# Patient Record
Sex: FEMALE | Race: WHITE | NOT HISPANIC OR LATINO
[De-identification: names, ages, dates, MRNs, and addresses within clinical notes are randomized per-mention and may not be internally consistent; named-entity substitution may affect disease eponyms.]

---

## 2017-02-07 ENCOUNTER — APPOINTMENT (OUTPATIENT)
Dept: ELECTROPHYSIOLOGY | Facility: CLINIC | Age: 82
End: 2017-02-07

## 2017-05-09 ENCOUNTER — APPOINTMENT (OUTPATIENT)
Dept: ELECTROPHYSIOLOGY | Facility: CLINIC | Age: 82
End: 2017-05-09

## 2017-05-24 ENCOUNTER — EMERGENCY (EMERGENCY)
Facility: HOSPITAL | Age: 82
LOS: 1 days | Discharge: HOME | End: 2017-05-24
Attending: EMERGENCY MEDICINE | Admitting: EMERGENCY MEDICINE
Payer: MEDICARE

## 2017-05-24 VITALS
RESPIRATION RATE: 15 BRPM | TEMPERATURE: 98 F | HEART RATE: 60 BPM | SYSTOLIC BLOOD PRESSURE: 157 MMHG | OXYGEN SATURATION: 97 % | DIASTOLIC BLOOD PRESSURE: 74 MMHG

## 2017-05-24 VITALS
RESPIRATION RATE: 16 BRPM | SYSTOLIC BLOOD PRESSURE: 142 MMHG | DIASTOLIC BLOOD PRESSURE: 72 MMHG | TEMPERATURE: 98 F | HEART RATE: 62 BPM | OXYGEN SATURATION: 97 %

## 2017-05-24 DIAGNOSIS — Z95.0 PRESENCE OF CARDIAC PACEMAKER: Chronic | ICD-10-CM

## 2017-05-24 LAB
ALBUMIN SERPL ELPH-MCNC: 3.7 G/DL — SIGNIFICANT CHANGE UP (ref 3.3–5)
ALP SERPL-CCNC: 51 U/L — SIGNIFICANT CHANGE UP (ref 40–120)
ALT FLD-CCNC: 10 U/L — SIGNIFICANT CHANGE UP (ref 4–33)
AST SERPL-CCNC: 17 U/L — SIGNIFICANT CHANGE UP (ref 4–32)
BASOPHILS # BLD AUTO: 0.04 K/UL — SIGNIFICANT CHANGE UP (ref 0–0.2)
BASOPHILS NFR BLD AUTO: 0.3 % — SIGNIFICANT CHANGE UP (ref 0–2)
BASOPHILS NFR SPEC: 0.9 % — SIGNIFICANT CHANGE UP (ref 0–2)
BILIRUB SERPL-MCNC: 0.5 MG/DL — SIGNIFICANT CHANGE UP (ref 0.2–1.2)
BUN SERPL-MCNC: 12 MG/DL — SIGNIFICANT CHANGE UP (ref 7–23)
CALCIUM SERPL-MCNC: 9.2 MG/DL — SIGNIFICANT CHANGE UP (ref 8.4–10.5)
CHLORIDE SERPL-SCNC: 97 MMOL/L — LOW (ref 98–107)
CO2 SERPL-SCNC: 25 MMOL/L — SIGNIFICANT CHANGE UP (ref 22–31)
CREAT SERPL-MCNC: 0.78 MG/DL — SIGNIFICANT CHANGE UP (ref 0.5–1.3)
EOSINOPHIL # BLD AUTO: 0.19 K/UL — SIGNIFICANT CHANGE UP (ref 0–0.5)
EOSINOPHIL NFR BLD AUTO: 1.6 % — SIGNIFICANT CHANGE UP (ref 0–6)
EOSINOPHIL NFR FLD: 1.7 % — SIGNIFICANT CHANGE UP (ref 0–6)
ERYTHROCYTE [SEDIMENTATION RATE] IN BLOOD: 25 MM/HR — SIGNIFICANT CHANGE UP (ref 4–25)
GIANT PLATELETS BLD QL SMEAR: PRESENT — SIGNIFICANT CHANGE UP
GLUCOSE SERPL-MCNC: 111 MG/DL — HIGH (ref 70–99)
HCT VFR BLD CALC: 40.4 % — SIGNIFICANT CHANGE UP (ref 34.5–45)
HGB BLD-MCNC: 12.4 G/DL — SIGNIFICANT CHANGE UP (ref 11.5–15.5)
IMM GRANULOCYTES NFR BLD AUTO: 0.2 % — SIGNIFICANT CHANGE UP (ref 0–1.5)
LYMPHOCYTES # BLD AUTO: 18.6 % — SIGNIFICANT CHANGE UP (ref 13–44)
LYMPHOCYTES # BLD AUTO: 2.28 K/UL — SIGNIFICANT CHANGE UP (ref 1–3.3)
LYMPHOCYTES NFR SPEC AUTO: 18.3 % — SIGNIFICANT CHANGE UP (ref 13–44)
MACROCYTES BLD QL: SIGNIFICANT CHANGE UP
MCHC RBC-ENTMCNC: 27.3 PG — SIGNIFICANT CHANGE UP (ref 27–34)
MCHC RBC-ENTMCNC: 30.7 % — LOW (ref 32–36)
MCV RBC AUTO: 88.8 FL — SIGNIFICANT CHANGE UP (ref 80–100)
MICROCYTES BLD QL: SLIGHT — SIGNIFICANT CHANGE UP
MONOCYTES # BLD AUTO: 1.62 K/UL — HIGH (ref 0–0.9)
MONOCYTES NFR BLD AUTO: 13.2 % — SIGNIFICANT CHANGE UP (ref 2–14)
MONOCYTES NFR BLD: 14.8 % — HIGH (ref 2–9)
NEUTROPHIL AB SER-ACNC: 62.6 % — SIGNIFICANT CHANGE UP (ref 43–77)
NEUTROPHILS # BLD AUTO: 8.08 K/UL — HIGH (ref 1.8–7.4)
NEUTROPHILS NFR BLD AUTO: 66.1 % — SIGNIFICANT CHANGE UP (ref 43–77)
PLATELET # BLD AUTO: 262 K/UL — SIGNIFICANT CHANGE UP (ref 150–400)
PLATELET COUNT - ESTIMATE: NORMAL — SIGNIFICANT CHANGE UP
PMV BLD: 11.4 FL — SIGNIFICANT CHANGE UP (ref 7–13)
POTASSIUM SERPL-MCNC: 3.7 MMOL/L — SIGNIFICANT CHANGE UP (ref 3.5–5.3)
POTASSIUM SERPL-SCNC: 3.7 MMOL/L — SIGNIFICANT CHANGE UP (ref 3.5–5.3)
PROT SERPL-MCNC: 7 G/DL — SIGNIFICANT CHANGE UP (ref 6–8.3)
RBC # BLD: 4.55 M/UL — SIGNIFICANT CHANGE UP (ref 3.8–5.2)
RBC # FLD: 15.1 % — HIGH (ref 10.3–14.5)
SODIUM SERPL-SCNC: 138 MMOL/L — SIGNIFICANT CHANGE UP (ref 135–145)
VARIANT LYMPHS # BLD: 1.7 % — SIGNIFICANT CHANGE UP
WBC # BLD: 12.24 K/UL — HIGH (ref 3.8–10.5)
WBC # FLD AUTO: 12.24 K/UL — HIGH (ref 3.8–10.5)

## 2017-05-24 PROCEDURE — 73120 X-RAY EXAM OF HAND: CPT | Mod: 26,RT

## 2017-05-24 PROCEDURE — 99284 EMERGENCY DEPT VISIT MOD MDM: CPT | Mod: GC

## 2017-05-24 RX ORDER — IBUPROFEN 200 MG
400 TABLET ORAL ONCE
Qty: 0 | Refills: 0 | Status: COMPLETED | OUTPATIENT
Start: 2017-05-24 | End: 2017-05-24

## 2017-05-24 RX ORDER — FAMOTIDINE 10 MG/ML
20 INJECTION INTRAVENOUS DAILY
Qty: 0 | Refills: 0 | Status: DISCONTINUED | OUTPATIENT
Start: 2017-05-24 | End: 2017-05-28

## 2017-05-24 RX ADMIN — FAMOTIDINE 20 MILLIGRAM(S): 10 INJECTION INTRAVENOUS at 12:30

## 2017-05-24 RX ADMIN — Medication 400 MILLIGRAM(S): at 12:30

## 2017-05-24 NOTE — ED ADULT TRIAGE NOTE - CHIEF COMPLAINT QUOTE
Pt c/o vomiting, diarrhea and weakness and 10/10 right hand pain x 2 days. Pt denies chest pain, abdominal pain, SOB, fever, chills. Pt appears comfortable.

## 2017-05-24 NOTE — ED PROVIDER NOTE - ATTENDING CONTRIBUTION TO CARE
c/o right hand pain and swelling, tenderness noted over thmb,no fever, no trauma history given; xray negative for fracture sed rate 25, discharged home Dx arthritis, to follow up PMD

## 2017-05-24 NOTE — ED ADULT NURSE NOTE - PSH
History of Appendectomy    History of Colon Resection    Open fracture of right hip  ORIF of Rt Hip (2011)  Pacemaker    S/P LAVERNE (Total Abdominal Hysterectomy)

## 2017-05-24 NOTE — ED ADULT NURSE NOTE - PMH
Acute Coronary Syndrome    Atrial Fibrillation    Chronic Constipation    H/O gastroesophageal reflux (GERD)    History of Colon Cancer    Hypertension    Renal Calculi    S/P Cardiac Pacemaker Procedure    Tachy-Francis Syndrome

## 2017-05-24 NOTE — ED PROVIDER NOTE - MEDICAL DECISION MAKING DETAILS
pt with thenar swelling, likely inflammatory arthritis vs septic joint vs fx, will xray hand, pain control, basic labs with CRP r/o septic joint.

## 2017-05-24 NOTE — ED ADULT NURSE NOTE - OBJECTIVE STATEMENT
Patient received in room 20. Pt. is A&O x3 and ambulatory with a  walker at baseline. Pt. speaks [place]uran and requests daughter to translate. Daughter at bedside. Pt. c/o 3 episodes of vomiting, diarrhea, and right hand pain since last night and reports being unable to sleep due to the pain. As per daughter, pt. fell on her hand 1 month ago. Right hand is slightly swollen. Pt. denies fever, chills, chest pain, SOB, dizziness, weakness, changes in urinary pattern, pain on urination. Respirations are even and unlabored. Abdomen is soft, nontender. Skin is dry and intact. No apparent distress at this time. VS as noted. Labs drawn and sent. Patient received in room 20. Pt. is A&O x3 and ambulatory with a  walker at baseline. Pt. speaks [place]ese and requests daughter to translate. Daughter at bedside. Pt. c/o 3 episodes of vomiting, diarrhea, and right hand pain since last night and reports being unable to sleep due to the pain. As per daughter, pt. fell on her hand 1 month ago. Right hand is slightly swollen. Pt. denies fever, chills, chest pain, SOB, dizziness, weakness, changes in urinary pattern, pain on urination. Respirations are even and unlabored. Abdomen is soft, nontender. Skin is dry and intact. No apparent distress at this time. VS as noted. 20 gauge IV inserted in right arm by MARIELLA Corral. Labs drawn and sent.

## 2017-08-14 ENCOUNTER — APPOINTMENT (OUTPATIENT)
Dept: ELECTROPHYSIOLOGY | Facility: CLINIC | Age: 82
End: 2017-08-14
Payer: MEDICARE

## 2017-08-14 PROCEDURE — 93294 REM INTERROG EVL PM/LDLS PM: CPT

## 2017-08-14 PROCEDURE — 93296 REM INTERROG EVL PM/IDS: CPT

## 2017-11-20 ENCOUNTER — APPOINTMENT (OUTPATIENT)
Dept: ELECTROPHYSIOLOGY | Facility: CLINIC | Age: 82
End: 2017-11-20
Payer: MEDICARE

## 2017-11-20 PROCEDURE — 93294 REM INTERROG EVL PM/LDLS PM: CPT

## 2017-11-20 PROCEDURE — 93296 REM INTERROG EVL PM/IDS: CPT

## 2018-03-06 ENCOUNTER — APPOINTMENT (OUTPATIENT)
Dept: ELECTROPHYSIOLOGY | Facility: CLINIC | Age: 83
End: 2018-03-06
Payer: MEDICARE

## 2018-03-06 PROCEDURE — 93294 REM INTERROG EVL PM/LDLS PM: CPT

## 2018-03-06 PROCEDURE — 93296 REM INTERROG EVL PM/IDS: CPT

## 2018-07-09 ENCOUNTER — APPOINTMENT (OUTPATIENT)
Dept: ELECTROPHYSIOLOGY | Facility: CLINIC | Age: 83
End: 2018-07-09
Payer: MEDICARE

## 2018-07-09 PROCEDURE — 93294 REM INTERROG EVL PM/LDLS PM: CPT

## 2018-07-09 PROCEDURE — 93296 REM INTERROG EVL PM/IDS: CPT

## 2018-10-15 ENCOUNTER — APPOINTMENT (OUTPATIENT)
Dept: ELECTROPHYSIOLOGY | Facility: CLINIC | Age: 83
End: 2018-10-15
Payer: MEDICARE

## 2018-10-15 DIAGNOSIS — I49.5 SICK SINUS SYNDROME: ICD-10-CM

## 2018-10-15 PROCEDURE — 93296 REM INTERROG EVL PM/IDS: CPT

## 2018-10-15 PROCEDURE — 93294 REM INTERROG EVL PM/LDLS PM: CPT

## 2019-01-22 ENCOUNTER — APPOINTMENT (OUTPATIENT)
Dept: ELECTROPHYSIOLOGY | Facility: CLINIC | Age: 84
End: 2019-01-22

## 2019-05-22 ENCOUNTER — APPOINTMENT (OUTPATIENT)
Dept: ELECTROPHYSIOLOGY | Facility: CLINIC | Age: 84
End: 2019-05-22
Payer: MEDICARE

## 2019-05-22 PROCEDURE — 93296 REM INTERROG EVL PM/IDS: CPT

## 2019-05-22 PROCEDURE — 93294 REM INTERROG EVL PM/LDLS PM: CPT

## 2019-08-22 ENCOUNTER — APPOINTMENT (OUTPATIENT)
Dept: ELECTROPHYSIOLOGY | Facility: CLINIC | Age: 84
End: 2019-08-22
Payer: MEDICARE

## 2019-08-22 PROCEDURE — 93296 REM INTERROG EVL PM/IDS: CPT

## 2019-08-22 PROCEDURE — 93294 REM INTERROG EVL PM/LDLS PM: CPT

## 2019-08-24 ENCOUNTER — EMERGENCY (EMERGENCY)
Facility: HOSPITAL | Age: 84
LOS: 1 days | Discharge: HOME | End: 2019-08-24
Attending: EMERGENCY MEDICINE | Admitting: EMERGENCY MEDICINE
Payer: MEDICARE

## 2019-08-24 VITALS
SYSTOLIC BLOOD PRESSURE: 105 MMHG | HEART RATE: 80 BPM | OXYGEN SATURATION: 98 % | TEMPERATURE: 99 F | RESPIRATION RATE: 16 BRPM | DIASTOLIC BLOOD PRESSURE: 64 MMHG

## 2019-08-24 VITALS
OXYGEN SATURATION: 100 % | HEART RATE: 60 BPM | RESPIRATION RATE: 17 BRPM | DIASTOLIC BLOOD PRESSURE: 72 MMHG | SYSTOLIC BLOOD PRESSURE: 110 MMHG

## 2019-08-24 DIAGNOSIS — Z95.0 PRESENCE OF CARDIAC PACEMAKER: Chronic | ICD-10-CM

## 2019-08-24 LAB
ALBUMIN SERPL ELPH-MCNC: 3.4 G/DL — SIGNIFICANT CHANGE UP (ref 3.3–5)
ALP SERPL-CCNC: 48 U/L — SIGNIFICANT CHANGE UP (ref 40–120)
ALT FLD-CCNC: 6 U/L — SIGNIFICANT CHANGE UP (ref 4–33)
ANION GAP SERPL CALC-SCNC: 12 MMO/L — SIGNIFICANT CHANGE UP (ref 7–14)
APPEARANCE UR: CLEAR — SIGNIFICANT CHANGE UP
APTT BLD: 30.6 SEC — SIGNIFICANT CHANGE UP (ref 27.5–36.3)
AST SERPL-CCNC: 13 U/L — SIGNIFICANT CHANGE UP (ref 4–32)
BACTERIA # UR AUTO: NEGATIVE — SIGNIFICANT CHANGE UP
BASE EXCESS BLDV CALC-SCNC: 6.1 MMOL/L — SIGNIFICANT CHANGE UP
BASOPHILS # BLD AUTO: 0.07 K/UL — SIGNIFICANT CHANGE UP (ref 0–0.2)
BASOPHILS NFR BLD AUTO: 0.7 % — SIGNIFICANT CHANGE UP (ref 0–2)
BILIRUB SERPL-MCNC: 0.3 MG/DL — SIGNIFICANT CHANGE UP (ref 0.2–1.2)
BILIRUB UR-MCNC: NEGATIVE — SIGNIFICANT CHANGE UP
BLOOD GAS VENOUS - CREATININE: 0.78 MG/DL — SIGNIFICANT CHANGE UP (ref 0.5–1.3)
BLOOD UR QL VISUAL: NEGATIVE — SIGNIFICANT CHANGE UP
BUN SERPL-MCNC: 7 MG/DL — SIGNIFICANT CHANGE UP (ref 7–23)
CALCIUM SERPL-MCNC: 9.5 MG/DL — SIGNIFICANT CHANGE UP (ref 8.4–10.5)
CHLORIDE BLDV-SCNC: 103 MMOL/L — SIGNIFICANT CHANGE UP (ref 96–108)
CHLORIDE SERPL-SCNC: 97 MMOL/L — LOW (ref 98–107)
CO2 SERPL-SCNC: 26 MMOL/L — SIGNIFICANT CHANGE UP (ref 22–31)
COLOR SPEC: SIGNIFICANT CHANGE UP
CREAT SERPL-MCNC: 0.8 MG/DL — SIGNIFICANT CHANGE UP (ref 0.5–1.3)
EOSINOPHIL # BLD AUTO: 0.41 K/UL — SIGNIFICANT CHANGE UP (ref 0–0.5)
EOSINOPHIL NFR BLD AUTO: 4.2 % — SIGNIFICANT CHANGE UP (ref 0–6)
GAS PNL BLDV: 129 MMOL/L — LOW (ref 136–146)
GLUCOSE BLDV-MCNC: 107 MG/DL — HIGH (ref 70–99)
GLUCOSE SERPL-MCNC: 104 MG/DL — HIGH (ref 70–99)
GLUCOSE UR-MCNC: NEGATIVE — SIGNIFICANT CHANGE UP
HCO3 BLDV-SCNC: 29 MMOL/L — HIGH (ref 20–27)
HCT VFR BLD CALC: 40.3 % — SIGNIFICANT CHANGE UP (ref 34.5–45)
HCT VFR BLDV CALC: 41.2 % — SIGNIFICANT CHANGE UP (ref 34.5–45)
HGB BLD-MCNC: 12.9 G/DL — SIGNIFICANT CHANGE UP (ref 11.5–15.5)
HGB BLDV-MCNC: 13.4 G/DL — SIGNIFICANT CHANGE UP (ref 11.5–15.5)
HYALINE CASTS # UR AUTO: NEGATIVE — SIGNIFICANT CHANGE UP
IMM GRANULOCYTES NFR BLD AUTO: 0.7 % — SIGNIFICANT CHANGE UP (ref 0–1.5)
INR BLD: 1 — SIGNIFICANT CHANGE UP (ref 0.88–1.17)
KETONES UR-MCNC: NEGATIVE — SIGNIFICANT CHANGE UP
LACTATE BLDV-MCNC: 1.6 MMOL/L — SIGNIFICANT CHANGE UP (ref 0.5–2)
LEUKOCYTE ESTERASE UR-ACNC: SIGNIFICANT CHANGE UP
LYMPHOCYTES # BLD AUTO: 2.04 K/UL — SIGNIFICANT CHANGE UP (ref 1–3.3)
LYMPHOCYTES # BLD AUTO: 20.8 % — SIGNIFICANT CHANGE UP (ref 13–44)
MCHC RBC-ENTMCNC: 29.7 PG — SIGNIFICANT CHANGE UP (ref 27–34)
MCHC RBC-ENTMCNC: 32 % — SIGNIFICANT CHANGE UP (ref 32–36)
MCV RBC AUTO: 92.6 FL — SIGNIFICANT CHANGE UP (ref 80–100)
MONOCYTES # BLD AUTO: 1.04 K/UL — HIGH (ref 0–0.9)
MONOCYTES NFR BLD AUTO: 10.6 % — SIGNIFICANT CHANGE UP (ref 2–14)
NEUTROPHILS # BLD AUTO: 6.2 K/UL — SIGNIFICANT CHANGE UP (ref 1.8–7.4)
NEUTROPHILS NFR BLD AUTO: 63 % — SIGNIFICANT CHANGE UP (ref 43–77)
NITRITE UR-MCNC: NEGATIVE — SIGNIFICANT CHANGE UP
NRBC # FLD: 0 K/UL — SIGNIFICANT CHANGE UP (ref 0–0)
PCO2 BLDV: 44 MMHG — SIGNIFICANT CHANGE UP (ref 41–51)
PH BLDV: 7.45 PH — HIGH (ref 7.32–7.43)
PH UR: 7 — SIGNIFICANT CHANGE UP (ref 5–8)
PLATELET # BLD AUTO: 259 K/UL — SIGNIFICANT CHANGE UP (ref 150–400)
PMV BLD: 10.8 FL — SIGNIFICANT CHANGE UP (ref 7–13)
PO2 BLDV: 47 MMHG — HIGH (ref 35–40)
POTASSIUM BLDV-SCNC: 2.7 MMOL/L — CRITICAL LOW (ref 3.4–4.5)
POTASSIUM SERPL-MCNC: 3 MMOL/L — LOW (ref 3.5–5.3)
POTASSIUM SERPL-SCNC: 3 MMOL/L — LOW (ref 3.5–5.3)
PROT SERPL-MCNC: 6.7 G/DL — SIGNIFICANT CHANGE UP (ref 6–8.3)
PROT UR-MCNC: NEGATIVE — SIGNIFICANT CHANGE UP
PROTHROM AB SERPL-ACNC: 11.1 SEC — SIGNIFICANT CHANGE UP (ref 9.8–13.1)
RBC # BLD: 4.35 M/UL — SIGNIFICANT CHANGE UP (ref 3.8–5.2)
RBC # FLD: 14.7 % — HIGH (ref 10.3–14.5)
RBC CASTS # UR COMP ASSIST: SIGNIFICANT CHANGE UP (ref 0–?)
SAO2 % BLDV: 82 % — SIGNIFICANT CHANGE UP (ref 60–85)
SODIUM SERPL-SCNC: 135 MMOL/L — SIGNIFICANT CHANGE UP (ref 135–145)
SP GR SPEC: 1.01 — SIGNIFICANT CHANGE UP (ref 1–1.04)
SQUAMOUS # UR AUTO: SIGNIFICANT CHANGE UP
UROBILINOGEN FLD QL: NORMAL — SIGNIFICANT CHANGE UP
WBC # BLD: 9.83 K/UL — SIGNIFICANT CHANGE UP (ref 3.8–10.5)
WBC # FLD AUTO: 9.83 K/UL — SIGNIFICANT CHANGE UP (ref 3.8–10.5)
WBC UR QL: HIGH (ref 0–?)

## 2019-08-24 PROCEDURE — 99284 EMERGENCY DEPT VISIT MOD MDM: CPT

## 2019-08-24 PROCEDURE — 74177 CT ABD & PELVIS W/CONTRAST: CPT | Mod: 26

## 2019-08-24 RX ORDER — ACETAMINOPHEN 500 MG
650 TABLET ORAL ONCE
Refills: 0 | Status: COMPLETED | OUTPATIENT
Start: 2019-08-24 | End: 2019-08-24

## 2019-08-24 RX ORDER — CEFTRIAXONE 500 MG/1
1000 INJECTION, POWDER, FOR SOLUTION INTRAMUSCULAR; INTRAVENOUS ONCE
Refills: 0 | Status: COMPLETED | OUTPATIENT
Start: 2019-08-24 | End: 2019-08-24

## 2019-08-24 RX ORDER — CEPHALEXIN 500 MG
1 CAPSULE ORAL
Qty: 28 | Refills: 0
Start: 2019-08-24 | End: 2019-08-30

## 2019-08-24 RX ORDER — SODIUM CHLORIDE 9 MG/ML
500 INJECTION INTRAMUSCULAR; INTRAVENOUS; SUBCUTANEOUS ONCE
Refills: 0 | Status: COMPLETED | OUTPATIENT
Start: 2019-08-24 | End: 2019-08-24

## 2019-08-24 RX ORDER — LIDOCAINE 4 G/100G
1 CREAM TOPICAL ONCE
Refills: 0 | Status: COMPLETED | OUTPATIENT
Start: 2019-08-24 | End: 2019-08-24

## 2019-08-24 RX ORDER — POTASSIUM CHLORIDE 20 MEQ
40 PACKET (EA) ORAL ONCE
Refills: 0 | Status: COMPLETED | OUTPATIENT
Start: 2019-08-24 | End: 2019-08-24

## 2019-08-24 RX ADMIN — Medication 650 MILLIGRAM(S): at 14:51

## 2019-08-24 RX ADMIN — SODIUM CHLORIDE 500 MILLILITER(S): 9 INJECTION INTRAMUSCULAR; INTRAVENOUS; SUBCUTANEOUS at 14:26

## 2019-08-24 RX ADMIN — Medication 40 MILLIEQUIVALENT(S): at 17:36

## 2019-08-24 RX ADMIN — CEFTRIAXONE 100 MILLIGRAM(S): 500 INJECTION, POWDER, FOR SOLUTION INTRAMUSCULAR; INTRAVENOUS at 17:36

## 2019-08-24 RX ADMIN — LIDOCAINE 1 PATCH: 4 CREAM TOPICAL at 14:51

## 2019-08-24 NOTE — ED ADULT NURSE NOTE - CHIEF COMPLAINT QUOTE
Pt Sami speaking, with daughter at bedside to translate. Pt brought in by EMS from home complaining of lower back pain. Pt recently had an X-ray. Pt denies recent fall or injury.

## 2019-08-24 NOTE — ED ADULT NURSE NOTE - OBJECTIVE STATEMENT
Pt arrived to room 13, a&ox4, ambulatory with walker at baseline, calm and cooperative. C/o of lower back pain and abdominal pain. Pt states to feel nauseous and constipated, last BM about 3 days ago. Pt denies fall or any injury, chest pain, sob, vomiting. Respirations even and unlabored, abdomen distended, tender to right quadrant, no lower extremity edema noted. provider at bedside to further eval Pt arrived to room 13, a&ox4, ambulatory with walker at baseline, calm and cooperative. C/o of lower back pain and abdominal pain. Pt states to feel nauseous and constipated, last BM about 3 days ago. Pt denies fall or any injury, chest pain, sob, vomiting. Respirations even and unlabored, abdomen distended, tender to right quadrant, no lower extremity edema noted. 20 G IV to right ac, labs drawn and sent. provider at bedside to further eval

## 2019-08-24 NOTE — ED ADULT NURSE NOTE - NSIMPLEMENTINTERV_GEN_ALL_ED
Implemented All Universal Safety Interventions:  [place] to call system. Call bell, personal items and telephone within reach. Instruct patient to call for assistance. Room bathroom lighting operational. Non-slip footwear when patient is off stretcher. Physically safe environment: no spills, clutter or unnecessary equipment. Stretcher in lowest position, wheels locked, appropriate side rails in place.

## 2019-08-24 NOTE — ED PROVIDER NOTE - CARE PLAN
Principal Discharge DX:	UTI (urinary tract infection)  Secondary Diagnosis:	Ventral hernia  Secondary Diagnosis:	Back pain

## 2019-08-24 NOTE — ED PROVIDER NOTE - OBJECTIVE STATEMENT
89 year old female with a PMHx of HTN, bradycardia s/p pacemaker, colon ca pw lower back pain for 1 week. Pain is worse when moving. Pts daughter is at the bedside and states that she is requiring more assistance due to her back discomfort. Ambulates with a walker at her baseline. Has been taking Tylenol without relief. Endorses that in the past she has had cortisone injections for the pain but her new PCP will not. She had an xray of the back which was WNL. Daughter states that she noticed her urine having a foul odor and she has had decreased PO intake and constipation. Denies n/v/f/c, CP, SOB, abdominal pain, dysuria, hematuria, melena, hematochezia, diarrhea, saddle anesthesia, urinary retention, bowel/bladder incontinence, numbness/weakness/tingling in the extremities.

## 2019-08-24 NOTE — ED ADULT TRIAGE NOTE - CHIEF COMPLAINT QUOTE
Pt Swedish speaking, with daughter at bedside to translate. Pt brought in by EMS from home complaining of lower back pain. Pt recently had an X-ray. Pt denies recent fall or injury.

## 2019-08-24 NOTE — ED PROVIDER NOTE - NSFOLLOWUPINSTRUCTIONS_ED_ALL_ED_FT
Drink plenty of fluids - stay hydrated. Take Keflex 500mg four times a day for 7 days. Please continue all home medications as directed. See your regular doctor within 72 hours for follow-up care. Return to ER for new or worsening symptoms.

## 2019-08-24 NOTE — ED PROVIDER NOTE - PROGRESS NOTE DETAILS
JAMES Wells: pt NAD, VSS, no acute complaints. Pt tolerating PO. Discussed results of labs and imaging with the patient. Pt ambulatory with walker as per daughter - does not have walker here. Pts daughter live with the patient at home - she does not have fall concerns if patient has walker with her - daughter is able to supervise the patient at home. Pt ok for dc.

## 2019-08-24 NOTE — ED PROVIDER NOTE - RELIEVING FACTORS
[Dear  ___] : Dear  [unfilled], [Courtesy Letter:] : I had the pleasure of seeing your patient, [unfilled], in my office today. [Please see my note below.] : Please see my note below. lying down

## 2019-08-24 NOTE — ED PROVIDER NOTE - ABDOMINAL TENDER
umbilical/epigastric/right upper quadrant/left lower quadrant/periumbilical/suprapubic/left upper quadrant/right lower quadrant

## 2019-08-24 NOTE — ED PROVIDER NOTE - ATTENDING CONTRIBUTION TO CARE
Physician at bedside.     Pt was seen and evaluated by me. Pt is a 89 y/o female with a PMHx of HTN, bradycardia s/p pacemaker, and Colon Ca who presented to the ED for low back pain X 1 wk. Pt states over the past week having low back pain. Pt had an Xray that showed possible compression fx. Pt denies any falls or trauma. Daughter states pt has been taking Tylenol with some relief. Pt admits to nausea, denies any vomiting. Denies any SOB, chest pain, or dysuria. Lungs CTA b/l. RRR. Abd soft with low abd tenderness. No midline tenderness. No CVAT. 5/5 b/l LE. No calf tenderness or swelling.

## 2019-08-24 NOTE — ED PROVIDER NOTE - CLINICAL SUMMARY MEDICAL DECISION MAKING FREE TEXT BOX
89 year old female with a PMHx of HTN, bradycardia s/p pacemaker, colon ca pw lower back pain for 1 week.   Plan: pain management, CT abd/pelvis r/o obstruction, UA r/o UTI, labs 89 year old female with a PMHx of HTN, bradycardia s/p pacemaker, colon ca pw lower back pain for 1 week with abd tenderness  Concern for Pyelo/UTI, Obstruction.   Plan: pain management, CT abd/pelvis r/o obstruction, UA r/o UTI, labs

## 2019-08-26 LAB
BACTERIA UR CULT: SIGNIFICANT CHANGE UP
SPECIMEN SOURCE: SIGNIFICANT CHANGE UP

## 2019-11-22 ENCOUNTER — APPOINTMENT (OUTPATIENT)
Dept: ELECTROPHYSIOLOGY | Facility: CLINIC | Age: 84
End: 2019-11-22
Payer: MEDICARE

## 2019-11-22 PROCEDURE — 93294 REM INTERROG EVL PM/LDLS PM: CPT

## 2019-11-22 PROCEDURE — 93296 REM INTERROG EVL PM/IDS: CPT

## 2020-02-08 ENCOUNTER — EMERGENCY (EMERGENCY)
Facility: HOSPITAL | Age: 85
LOS: 1 days | Discharge: HOME | End: 2020-02-08
Attending: EMERGENCY MEDICINE | Admitting: EMERGENCY MEDICINE
Payer: MEDICARE

## 2020-02-08 VITALS
HEART RATE: 70 BPM | RESPIRATION RATE: 16 BRPM | OXYGEN SATURATION: 100 % | SYSTOLIC BLOOD PRESSURE: 133 MMHG | DIASTOLIC BLOOD PRESSURE: 67 MMHG | TEMPERATURE: 98 F

## 2020-02-08 VITALS
DIASTOLIC BLOOD PRESSURE: 61 MMHG | RESPIRATION RATE: 18 BRPM | SYSTOLIC BLOOD PRESSURE: 128 MMHG | HEART RATE: 68 BPM | OXYGEN SATURATION: 100 % | TEMPERATURE: 98 F

## 2020-02-08 DIAGNOSIS — Z95.0 PRESENCE OF CARDIAC PACEMAKER: Chronic | ICD-10-CM

## 2020-02-08 LAB
ALBUMIN SERPL ELPH-MCNC: 3.6 G/DL — SIGNIFICANT CHANGE UP (ref 3.3–5)
ALP SERPL-CCNC: 51 U/L — SIGNIFICANT CHANGE UP (ref 40–120)
ALT FLD-CCNC: 10 U/L — SIGNIFICANT CHANGE UP (ref 4–33)
ANION GAP SERPL CALC-SCNC: 14 MMO/L — SIGNIFICANT CHANGE UP (ref 7–14)
AST SERPL-CCNC: 18 U/L — SIGNIFICANT CHANGE UP (ref 4–32)
BILIRUB SERPL-MCNC: 0.5 MG/DL — SIGNIFICANT CHANGE UP (ref 0.2–1.2)
BUN SERPL-MCNC: 9 MG/DL — SIGNIFICANT CHANGE UP (ref 7–23)
CALCIUM SERPL-MCNC: 10.2 MG/DL — SIGNIFICANT CHANGE UP (ref 8.4–10.5)
CHLORIDE SERPL-SCNC: 93 MMOL/L — LOW (ref 98–107)
CO2 SERPL-SCNC: 28 MMOL/L — SIGNIFICANT CHANGE UP (ref 22–31)
CREAT SERPL-MCNC: 0.88 MG/DL — SIGNIFICANT CHANGE UP (ref 0.5–1.3)
GLUCOSE SERPL-MCNC: 110 MG/DL — HIGH (ref 70–99)
HCT VFR BLD CALC: 45.9 % — HIGH (ref 34.5–45)
HGB BLD-MCNC: 14.4 G/DL — SIGNIFICANT CHANGE UP (ref 11.5–15.5)
LIDOCAIN IGE QN: 11.9 U/L — SIGNIFICANT CHANGE UP (ref 7–60)
MCHC RBC-ENTMCNC: 31.2 PG — SIGNIFICANT CHANGE UP (ref 27–34)
MCHC RBC-ENTMCNC: 31.4 % — LOW (ref 32–36)
MCV RBC AUTO: 99.4 FL — SIGNIFICANT CHANGE UP (ref 80–100)
NRBC # FLD: 0 K/UL — SIGNIFICANT CHANGE UP (ref 0–0)
PLATELET # BLD AUTO: 260 K/UL — SIGNIFICANT CHANGE UP (ref 150–400)
PMV BLD: 11.2 FL — SIGNIFICANT CHANGE UP (ref 7–13)
POTASSIUM SERPL-MCNC: 3.3 MMOL/L — LOW (ref 3.5–5.3)
POTASSIUM SERPL-SCNC: 3.3 MMOL/L — LOW (ref 3.5–5.3)
PROT SERPL-MCNC: 7.4 G/DL — SIGNIFICANT CHANGE UP (ref 6–8.3)
RBC # BLD: 4.62 M/UL — SIGNIFICANT CHANGE UP (ref 3.8–5.2)
RBC # FLD: 13.3 % — SIGNIFICANT CHANGE UP (ref 10.3–14.5)
SODIUM SERPL-SCNC: 135 MMOL/L — SIGNIFICANT CHANGE UP (ref 135–145)
WBC # BLD: 13.96 K/UL — HIGH (ref 3.8–10.5)
WBC # FLD AUTO: 13.96 K/UL — HIGH (ref 3.8–10.5)

## 2020-02-08 PROCEDURE — 74177 CT ABD & PELVIS W/CONTRAST: CPT | Mod: 26

## 2020-02-08 PROCEDURE — 99284 EMERGENCY DEPT VISIT MOD MDM: CPT | Mod: GC

## 2020-02-08 RX ORDER — AER TRAVELER 0.5 G/1
1 SOLUTION RECTAL; TOPICAL ONCE
Refills: 0 | Status: COMPLETED | OUTPATIENT
Start: 2020-02-08 | End: 2020-02-08

## 2020-02-08 RX ORDER — ACETAMINOPHEN 500 MG
650 TABLET ORAL ONCE
Refills: 0 | Status: COMPLETED | OUTPATIENT
Start: 2020-02-08 | End: 2020-02-08

## 2020-02-08 RX ORDER — SODIUM CHLORIDE 9 MG/ML
500 INJECTION INTRAMUSCULAR; INTRAVENOUS; SUBCUTANEOUS ONCE
Refills: 0 | Status: COMPLETED | OUTPATIENT
Start: 2020-02-08 | End: 2020-02-08

## 2020-02-08 RX ORDER — AER TRAVELER 0.5 G/1
1 SOLUTION RECTAL; TOPICAL ONCE
Refills: 0 | Status: DISCONTINUED | OUTPATIENT
Start: 2020-02-08 | End: 2020-02-08

## 2020-02-08 RX ORDER — ONDANSETRON 8 MG/1
4 TABLET, FILM COATED ORAL ONCE
Refills: 0 | Status: COMPLETED | OUTPATIENT
Start: 2020-02-08 | End: 2020-02-08

## 2020-02-08 RX ADMIN — Medication 650 MILLIGRAM(S): at 16:46

## 2020-02-08 RX ADMIN — SODIUM CHLORIDE 500 MILLILITER(S): 9 INJECTION INTRAMUSCULAR; INTRAVENOUS; SUBCUTANEOUS at 15:59

## 2020-02-08 RX ADMIN — Medication 650 MILLIGRAM(S): at 16:00

## 2020-02-08 RX ADMIN — ONDANSETRON 4 MILLIGRAM(S): 8 TABLET, FILM COATED ORAL at 16:00

## 2020-02-08 NOTE — ED PROVIDER NOTE - NSFOLLOWUPINSTRUCTIONS_ED_ALL_ED_FT
You were seen in the Emergency Department for abdominal pain and disimpacted due to concerns about constipation.  Recommend taking Docusate and Senna (over-the-counter) as per package directions to assist with constipation.  Can also use Sitz baths and Preoperation H for pain associated with hemorrhoids.    Recommend follow-up with surgery if persistent pain; referral sheet provided.    Follow-up with your Primary Care Physician within 24-48 hours.  Please return to the Emergency Department immediately for any new, worsening or concerning symptoms.    Copy of your lab work, imaging and/or other testing performed in the ER is attached along with your discharge paperwork.  Please take this to your Primary Care Physician for further discussion, evaluation and comparison with your prior blood work results.

## 2020-02-08 NOTE — ED PROVIDER NOTE - CLINICAL SUMMARY MEDICAL DECISION MAKING FREE TEXT BOX
89 yo HTN p/w constipation and fecal impaction w/ mild abdominal discomfort. No temperature, hemodynamically stable. Disimpacted, will give tylenol, fluids, zofran, miralax, f/u for BM.

## 2020-02-08 NOTE — ED PROVIDER NOTE - PHYSICAL EXAMINATION
Constitutional: NAD, awake and alert  EYES: EOMI  ENT:  Normal Hearing, no tonsillar exudates   Neck: Soft and supple , no thyromegaly   Respiratory: Breath sounds are clear bilaterally, No wheezing, rales or rhonchi  Cardiovascular: S1 and S2, regular rate and rhythm, no Murmurs, gallops or rubs, no JVD,    Gastrointestinal: Bowel Sounds present, soft, nontender, distention, no guarding, no rebound  Extremities: No cyanosis or clubbing; warm to touch  Vascular: 2+ peripheral pulses lower ex  Neurological: No focal deficits, CN II-XII intact bilaterally, sensation to light touch intact in all extremities, gait intact. Pupils are equally reactive to light and symmetrical in size.   Musculoskeletal: 5/5 strength b/l upper and lower extremities; no joint swelling.  Skin: No rashes  Psych: no depression or anhedonia, AAOx3  HEME: no bruises, no nose bleeds Constitutional: NAD, awake and alert  EYES: EOMI  ENT:  Normal Hearing, no tonsillar exudates   Neck: Soft and supple , no thyromegaly   Respiratory: Breath sounds are clear bilaterally, No wheezing, rales or rhonchi  Cardiovascular: SNo Murmurs, gallops or rubs, no JVD,    Gastrointestinal: Bowel Sounds present, soft, nontender, distention, no guarding, no rebound  Extremities: No cyanosis or clubbing; warm to touch  Vascular: 2+ peripheral pulses lower ex  Neurological: No focal deficits, CN II-XII intact bilaterally, sensation to light touch intact in all extremities, gait intact. Pupils are equally reactive to light and symmetrical in size.   Musculoskeletal: 5/5 strength b/l upper and lower extremities; no joint swelling.  Skin: No rashes  Psych: no depression or anhedonia, AAOx3  HEME: no bruises, no nose bleeds

## 2020-02-08 NOTE — ED PROVIDER NOTE - PATIENT PORTAL LINK FT
You can access the FollowMyHealth Patient Portal offered by [facility] by registering at the following website: [email or web address]. By joining [facility]’s FollowMyHealth portal, you will also be able to view your health information using other applications (apps) compatible with our system.

## 2020-02-08 NOTE — ED ADULT NURSE NOTE - OBJECTIVE STATEMENT
Pt a&ox3 brought in for constipation for the past week, abdominal pain, nausea and pain to rectum, h/o hemorrhoids, denies blood in stool, breathing even and unlabored, denies cp/discomfort, no headache/dizziness, skin is cool dry and intact, ivl labs, will continue to monitor.

## 2020-02-08 NOTE — ED PROVIDER NOTE - NS ED ROS FT
ROS:    Constitutional: [ ] fevers [ ] chills [ ] weight loss [ ] weight gain  HEENT: [ ] dry eyes [ ] eye irritation [ ] postnasal drip [ ] nasal congestion  CV: [ ] chest pain [ ] orthopnea [ ] palpitations [ ] murmur  Resp: [ ] cough [ ] shortness of breath [ ] dyspnea [ ] wheezing [ ] sputum [ x] hemoptysis  GI: [ ] nausea [ ] vomiting [ ] diarrhea [ x] constipation [ ] abd pain [ ] dysphagia   : [ ] dysuria [ ] nocturia [ ] hematuria [ ] increased urinary frequency  Musculoskeletal: [ ] back pain [ ] myalgias [ ] arthralgias [ ] fracture  Skin: [ ] rash [ ] itch  Neurological: [ ] headache [ ] dizziness [ ] syncope [ ] weakness [ ] numbness  Psychiatric: [ ] anxiety [ ] depression  Endocrine: [ ] diabetes [ ] thyroid problem  Hematologic/Lymphatic: [ ] anemia [ ] bleeding problem  Allergic/Immunologic: [ ] itchy eyes [ ] nasal discharge [ ] hives [ ] angioedema  [ ] All other systems negative  [ ] Unable to assess ROS because ________

## 2020-02-08 NOTE — ED PROVIDER NOTE - OBJECTIVE STATEMENT
89 yo woman PMH of HTN here for constipation. Pt has not had regular bowel movement for 3-4 days per family, reported having some liquid stool, no BRBPR or melena. Daughter reports being told patient needs a disimpaction by PCP, daughter reports giving miralax, suppository, enema at home. Denying fevers, chills, SOB, CP, N/V. Pt w/ abdominal discomfort and mild distention but no focal pain. Pt has history of constipation, never required disimpaction.

## 2020-02-08 NOTE — ED PROVIDER NOTE - PROGRESS NOTE DETAILS
Dr. Vera: Pt was disimpacted with a large amount of stool. Pt states feeling better but continues to have lower abd discomfort, awaiting CT. Mattie ACKERMAN: Patient reports feeling better; discussed CT scan results and possible surgical evaluation for hernia.  Family declines; risk, benefits and alternatives discussed.  Surgical outpatient follow-up provided.

## 2020-02-08 NOTE — ED PROVIDER NOTE - ATTENDING CONTRIBUTION TO CARE
Pt was seen and evaluated by me. Pt is a 89 y/o female with PMHx of HTN, CAD, A-fib, and Chronic constipation who presented to the ED for constipation and lower abd pain X 3-4 days. Daughter notes pt has not had a BM in 3-4 days. Pt was given Miralax, Suppository, and Enema at home with no relief. Daughter contacted PMD who advised daughter to disimpact pt which daughter attempted. Pt admits to some abd discomfort. Denies any fever, chills, nausea, vomiting, SOB, or chest pain. Lungs CTA b/l. RRR. Abd soft with ventral hernia and lower abd tenderness. Moderate stool in rectal vault.  Concern for impaction/obstruction/incarcerated hernia.  Labs, IVF, Disimpaction, CT.

## 2020-02-21 ENCOUNTER — APPOINTMENT (OUTPATIENT)
Dept: ELECTROPHYSIOLOGY | Facility: CLINIC | Age: 85
End: 2020-02-21
Payer: MEDICARE

## 2020-02-21 PROCEDURE — 93294 REM INTERROG EVL PM/LDLS PM: CPT

## 2020-02-21 PROCEDURE — 93296 REM INTERROG EVL PM/IDS: CPT

## 2020-05-22 ENCOUNTER — APPOINTMENT (OUTPATIENT)
Dept: ELECTROPHYSIOLOGY | Facility: CLINIC | Age: 85
End: 2020-05-22
Payer: MEDICARE

## 2020-05-22 PROCEDURE — 93294 REM INTERROG EVL PM/LDLS PM: CPT

## 2020-05-22 PROCEDURE — 93296 REM INTERROG EVL PM/IDS: CPT

## 2020-08-21 ENCOUNTER — APPOINTMENT (OUTPATIENT)
Dept: ELECTROPHYSIOLOGY | Facility: CLINIC | Age: 85
End: 2020-08-21
Payer: MEDICARE

## 2020-08-21 PROCEDURE — 93294 REM INTERROG EVL PM/LDLS PM: CPT

## 2020-08-21 PROCEDURE — 93296 REM INTERROG EVL PM/IDS: CPT

## 2020-11-20 ENCOUNTER — APPOINTMENT (OUTPATIENT)
Dept: ELECTROPHYSIOLOGY | Facility: CLINIC | Age: 85
End: 2020-11-20
Payer: MEDICARE

## 2020-11-20 PROCEDURE — 93294 REM INTERROG EVL PM/LDLS PM: CPT

## 2020-11-20 PROCEDURE — 93296 REM INTERROG EVL PM/IDS: CPT

## 2020-11-23 ENCOUNTER — FORM ENCOUNTER (OUTPATIENT)
Age: 85
End: 2020-11-23

## 2021-01-01 ENCOUNTER — INPATIENT (INPATIENT)
Facility: HOSPITAL | Age: 86
LOS: 5 days | Discharge: HOME | DRG: 56 | End: 2022-01-06
Attending: INTERNAL MEDICINE | Admitting: INTERNAL MEDICINE
Payer: MEDICARE

## 2021-01-01 ENCOUNTER — APPOINTMENT (OUTPATIENT)
Dept: ELECTROPHYSIOLOGY | Facility: CLINIC | Age: 86
End: 2021-01-01
Payer: MEDICARE

## 2021-01-01 ENCOUNTER — INPATIENT (INPATIENT)
Facility: HOSPITAL | Age: 86
LOS: 2 days | Discharge: HOME HEALTH | DRG: 690 | End: 2021-11-14
Attending: INTERNAL MEDICINE | Admitting: INTERNAL MEDICINE
Payer: MEDICARE

## 2021-01-01 ENCOUNTER — TRANSCRIPTION ENCOUNTER (OUTPATIENT)
Age: 86
End: 2021-01-01

## 2021-01-01 ENCOUNTER — NON-APPOINTMENT (OUTPATIENT)
Age: 86
End: 2021-01-01

## 2021-01-01 ENCOUNTER — FORM ENCOUNTER (OUTPATIENT)
Age: 86
End: 2021-01-01

## 2021-01-01 VITALS
SYSTOLIC BLOOD PRESSURE: 117 MMHG | DIASTOLIC BLOOD PRESSURE: 81 MMHG | HEART RATE: 97 BPM | OXYGEN SATURATION: 99 % | RESPIRATION RATE: 18 BRPM | TEMPERATURE: 98 F | HEIGHT: 64 IN

## 2021-01-01 VITALS
TEMPERATURE: 98 F | HEART RATE: 75 BPM | SYSTOLIC BLOOD PRESSURE: 146 MMHG | RESPIRATION RATE: 18 BRPM | OXYGEN SATURATION: 96 % | DIASTOLIC BLOOD PRESSURE: 68 MMHG

## 2021-01-01 VITALS
TEMPERATURE: 98 F | DIASTOLIC BLOOD PRESSURE: 85 MMHG | HEIGHT: 64 IN | WEIGHT: 151.9 LBS | RESPIRATION RATE: 18 BRPM | SYSTOLIC BLOOD PRESSURE: 145 MMHG | HEART RATE: 89 BPM

## 2021-01-01 DIAGNOSIS — Z95.0 PRESENCE OF CARDIAC PACEMAKER: Chronic | ICD-10-CM

## 2021-01-01 DIAGNOSIS — N17.9 ACUTE KIDNEY FAILURE, UNSPECIFIED: ICD-10-CM

## 2021-01-01 DIAGNOSIS — R62.7 ADULT FAILURE TO THRIVE: ICD-10-CM

## 2021-01-01 LAB
ALBUMIN SERPL ELPH-MCNC: 2.4 G/DL — LOW (ref 3.3–5)
ALBUMIN SERPL ELPH-MCNC: 3.4 G/DL — SIGNIFICANT CHANGE UP (ref 3.3–5)
ALP SERPL-CCNC: 61 U/L — SIGNIFICANT CHANGE UP (ref 40–120)
ALP SERPL-CCNC: 65 U/L — SIGNIFICANT CHANGE UP (ref 40–120)
ALT FLD-CCNC: 14 U/L — SIGNIFICANT CHANGE UP (ref 10–45)
ALT FLD-CCNC: 9 U/L — LOW (ref 10–45)
ANION GAP SERPL CALC-SCNC: 12 MMOL/L — SIGNIFICANT CHANGE UP (ref 5–17)
ANION GAP SERPL CALC-SCNC: 12 MMOL/L — SIGNIFICANT CHANGE UP (ref 5–17)
ANION GAP SERPL CALC-SCNC: 13 MMOL/L — SIGNIFICANT CHANGE UP (ref 5–17)
ANION GAP SERPL CALC-SCNC: 14 MMOL/L — SIGNIFICANT CHANGE UP (ref 5–17)
ANION GAP SERPL CALC-SCNC: 15 MMOL/L — SIGNIFICANT CHANGE UP (ref 5–17)
ANION GAP SERPL CALC-SCNC: 16 MMOL/L — SIGNIFICANT CHANGE UP (ref 5–17)
ANION GAP SERPL CALC-SCNC: 30 MMOL/L — HIGH (ref 5–17)
APPEARANCE UR: ABNORMAL
APPEARANCE UR: ABNORMAL
AST SERPL-CCNC: 25 U/L — SIGNIFICANT CHANGE UP (ref 10–40)
AST SERPL-CCNC: 46 U/L — HIGH (ref 10–40)
BACTERIA # UR AUTO: ABNORMAL
BACTERIA # UR AUTO: NEGATIVE — SIGNIFICANT CHANGE UP
BASE EXCESS BLDV CALC-SCNC: 4.5 MMOL/L — HIGH (ref -2–2)
BASOPHILS # BLD AUTO: 0 K/UL — SIGNIFICANT CHANGE UP (ref 0–0.2)
BASOPHILS # BLD AUTO: 0.09 K/UL — SIGNIFICANT CHANGE UP (ref 0–0.2)
BASOPHILS NFR BLD AUTO: 0 % — SIGNIFICANT CHANGE UP (ref 0–2)
BASOPHILS NFR BLD AUTO: 0.8 % — SIGNIFICANT CHANGE UP (ref 0–2)
BILIRUB SERPL-MCNC: 0.3 MG/DL — SIGNIFICANT CHANGE UP (ref 0.2–1.2)
BILIRUB SERPL-MCNC: 0.9 MG/DL — SIGNIFICANT CHANGE UP (ref 0.2–1.2)
BILIRUB UR-MCNC: NEGATIVE — SIGNIFICANT CHANGE UP
BILIRUB UR-MCNC: NEGATIVE — SIGNIFICANT CHANGE UP
BUN SERPL-MCNC: 14 MG/DL — SIGNIFICANT CHANGE UP (ref 7–23)
BUN SERPL-MCNC: 15 MG/DL — SIGNIFICANT CHANGE UP (ref 7–23)
BUN SERPL-MCNC: 17 MG/DL — SIGNIFICANT CHANGE UP (ref 7–23)
BUN SERPL-MCNC: 18 MG/DL — SIGNIFICANT CHANGE UP (ref 7–23)
BUN SERPL-MCNC: 18 MG/DL — SIGNIFICANT CHANGE UP (ref 7–23)
BUN SERPL-MCNC: 19 MG/DL — SIGNIFICANT CHANGE UP (ref 7–23)
BUN SERPL-MCNC: 23 MG/DL — SIGNIFICANT CHANGE UP (ref 7–23)
CA-I SERPL-SCNC: 1.12 MMOL/L — LOW (ref 1.15–1.33)
CALCIUM SERPL-MCNC: 6.8 MG/DL — LOW (ref 8.4–10.5)
CALCIUM SERPL-MCNC: 8.6 MG/DL — SIGNIFICANT CHANGE UP (ref 8.4–10.5)
CALCIUM SERPL-MCNC: 9 MG/DL — SIGNIFICANT CHANGE UP (ref 8.4–10.5)
CALCIUM SERPL-MCNC: 9.1 MG/DL — SIGNIFICANT CHANGE UP (ref 8.4–10.5)
CALCIUM SERPL-MCNC: 9.5 MG/DL — SIGNIFICANT CHANGE UP (ref 8.4–10.5)
CALCIUM SERPL-MCNC: 9.5 MG/DL — SIGNIFICANT CHANGE UP (ref 8.4–10.5)
CALCIUM SERPL-MCNC: 9.8 MG/DL — SIGNIFICANT CHANGE UP (ref 8.4–10.5)
CHLORIDE BLDV-SCNC: 91 MMOL/L — LOW (ref 96–108)
CHLORIDE SERPL-SCNC: 100 MMOL/L — SIGNIFICANT CHANGE UP (ref 96–108)
CHLORIDE SERPL-SCNC: 74 MMOL/L — LOW (ref 96–108)
CHLORIDE SERPL-SCNC: 91 MMOL/L — LOW (ref 96–108)
CHLORIDE SERPL-SCNC: 96 MMOL/L — SIGNIFICANT CHANGE UP (ref 96–108)
CHLORIDE SERPL-SCNC: 97 MMOL/L — SIGNIFICANT CHANGE UP (ref 96–108)
CHLORIDE UR-SCNC: 52 MMOL/L — SIGNIFICANT CHANGE UP
CO2 BLDV-SCNC: 33 MMOL/L — HIGH (ref 22–26)
CO2 SERPL-SCNC: 16 MMOL/L — LOW (ref 22–31)
CO2 SERPL-SCNC: 20 MMOL/L — LOW (ref 22–31)
CO2 SERPL-SCNC: 20 MMOL/L — LOW (ref 22–31)
CO2 SERPL-SCNC: 24 MMOL/L — SIGNIFICANT CHANGE UP (ref 22–31)
CO2 SERPL-SCNC: 24 MMOL/L — SIGNIFICANT CHANGE UP (ref 22–31)
CO2 SERPL-SCNC: 26 MMOL/L — SIGNIFICANT CHANGE UP (ref 22–31)
CO2 SERPL-SCNC: 27 MMOL/L — SIGNIFICANT CHANGE UP (ref 22–31)
COLOR SPEC: SIGNIFICANT CHANGE UP
COLOR SPEC: YELLOW — SIGNIFICANT CHANGE UP
CREAT ?TM UR-MCNC: 43 MG/DL — SIGNIFICANT CHANGE UP
CREAT SERPL-MCNC: 0.72 MG/DL — SIGNIFICANT CHANGE UP (ref 0.5–1.3)
CREAT SERPL-MCNC: 0.97 MG/DL — SIGNIFICANT CHANGE UP (ref 0.5–1.3)
CREAT SERPL-MCNC: 1 MG/DL — SIGNIFICANT CHANGE UP (ref 0.5–1.3)
CREAT SERPL-MCNC: 1.6 MG/DL — HIGH (ref 0.5–1.3)
CREAT SERPL-MCNC: 1.76 MG/DL — HIGH (ref 0.5–1.3)
CREAT SERPL-MCNC: 1.82 MG/DL — HIGH (ref 0.5–1.3)
CREAT SERPL-MCNC: 2.01 MG/DL — HIGH (ref 0.5–1.3)
CULTURE RESULTS: SIGNIFICANT CHANGE UP
DIFF PNL FLD: ABNORMAL
DIFF PNL FLD: NEGATIVE — SIGNIFICANT CHANGE UP
EOSINOPHIL # BLD AUTO: 0 K/UL — SIGNIFICANT CHANGE UP (ref 0–0.5)
EOSINOPHIL # BLD AUTO: 0.38 K/UL — SIGNIFICANT CHANGE UP (ref 0–0.5)
EOSINOPHIL NFR BLD AUTO: 0 % — SIGNIFICANT CHANGE UP (ref 0–6)
EOSINOPHIL NFR BLD AUTO: 3.4 % — SIGNIFICANT CHANGE UP (ref 0–6)
EPI CELLS # UR: 5 /HPF — SIGNIFICANT CHANGE UP
EPI CELLS # UR: 6 /HPF — HIGH
GAS PNL BLDV: 125 MMOL/L — LOW (ref 136–145)
GAS PNL BLDV: SIGNIFICANT CHANGE UP
GAS PNL BLDV: SIGNIFICANT CHANGE UP
GLUCOSE BLDV-MCNC: 89 MG/DL — SIGNIFICANT CHANGE UP (ref 70–99)
GLUCOSE SERPL-MCNC: 115 MG/DL — HIGH (ref 70–99)
GLUCOSE SERPL-MCNC: 117 MG/DL — HIGH (ref 70–99)
GLUCOSE SERPL-MCNC: 75 MG/DL — SIGNIFICANT CHANGE UP (ref 70–99)
GLUCOSE SERPL-MCNC: 82 MG/DL — SIGNIFICANT CHANGE UP (ref 70–99)
GLUCOSE SERPL-MCNC: 92 MG/DL — SIGNIFICANT CHANGE UP (ref 70–99)
GLUCOSE SERPL-MCNC: 92 MG/DL — SIGNIFICANT CHANGE UP (ref 70–99)
GLUCOSE SERPL-MCNC: 954 MG/DL — CRITICAL HIGH (ref 70–99)
GLUCOSE UR QL: NEGATIVE — SIGNIFICANT CHANGE UP
GLUCOSE UR QL: NEGATIVE — SIGNIFICANT CHANGE UP
HCO3 BLDV-SCNC: 32 MMOL/L — HIGH (ref 22–29)
HCT VFR BLD CALC: 34.3 % — LOW (ref 34.5–45)
HCT VFR BLD CALC: 34.4 % — LOW (ref 34.5–45)
HCT VFR BLD CALC: 37.2 % — SIGNIFICANT CHANGE UP (ref 34.5–45)
HCT VFR BLD CALC: 39.2 % — SIGNIFICANT CHANGE UP (ref 34.5–45)
HCT VFR BLDA CALC: 37 % — SIGNIFICANT CHANGE UP (ref 34.5–46.5)
HGB BLD CALC-MCNC: 12.2 G/DL — SIGNIFICANT CHANGE UP (ref 11.7–16.1)
HGB BLD-MCNC: 11 G/DL — LOW (ref 11.5–15.5)
HGB BLD-MCNC: 11 G/DL — LOW (ref 11.5–15.5)
HGB BLD-MCNC: 11.9 G/DL — SIGNIFICANT CHANGE UP (ref 11.5–15.5)
HGB BLD-MCNC: 12.4 G/DL — SIGNIFICANT CHANGE UP (ref 11.5–15.5)
HYALINE CASTS # UR AUTO: 1 /LPF — SIGNIFICANT CHANGE UP (ref 0–2)
HYALINE CASTS # UR AUTO: 2 /LPF — SIGNIFICANT CHANGE UP (ref 0–7)
IMM GRANULOCYTES NFR BLD AUTO: 1.5 % — SIGNIFICANT CHANGE UP (ref 0–1.5)
KETONES UR-MCNC: ABNORMAL
KETONES UR-MCNC: NEGATIVE — SIGNIFICANT CHANGE UP
LACTATE BLDV-MCNC: 2.1 MMOL/L — HIGH (ref 0.7–2)
LEUKOCYTE ESTERASE UR-ACNC: ABNORMAL
LEUKOCYTE ESTERASE UR-ACNC: ABNORMAL
LIDOCAIN IGE QN: 20 U/L — SIGNIFICANT CHANGE UP (ref 7–60)
LYMPHOCYTES # BLD AUTO: 0.81 K/UL — LOW (ref 1–3.3)
LYMPHOCYTES # BLD AUTO: 2.65 K/UL — SIGNIFICANT CHANGE UP (ref 1–3.3)
LYMPHOCYTES # BLD AUTO: 23.9 % — SIGNIFICANT CHANGE UP (ref 13–44)
LYMPHOCYTES # BLD AUTO: 5.2 % — LOW (ref 13–44)
MAGNESIUM SERPL-MCNC: 2.2 MG/DL — SIGNIFICANT CHANGE UP (ref 1.6–2.6)
MANUAL SMEAR VERIFICATION: SIGNIFICANT CHANGE UP
MCHC RBC-ENTMCNC: 28.1 PG — SIGNIFICANT CHANGE UP (ref 27–34)
MCHC RBC-ENTMCNC: 28.2 PG — SIGNIFICANT CHANGE UP (ref 27–34)
MCHC RBC-ENTMCNC: 28.6 PG — SIGNIFICANT CHANGE UP (ref 27–34)
MCHC RBC-ENTMCNC: 29.1 PG — SIGNIFICANT CHANGE UP (ref 27–34)
MCHC RBC-ENTMCNC: 31.6 GM/DL — LOW (ref 32–36)
MCHC RBC-ENTMCNC: 32 GM/DL — SIGNIFICANT CHANGE UP (ref 32–36)
MCHC RBC-ENTMCNC: 32 GM/DL — SIGNIFICANT CHANGE UP (ref 32–36)
MCHC RBC-ENTMCNC: 32.1 GM/DL — SIGNIFICANT CHANGE UP (ref 32–36)
MCV RBC AUTO: 88 FL — SIGNIFICANT CHANGE UP (ref 80–100)
MCV RBC AUTO: 89.1 FL — SIGNIFICANT CHANGE UP (ref 80–100)
MCV RBC AUTO: 89.1 FL — SIGNIFICANT CHANGE UP (ref 80–100)
MCV RBC AUTO: 91 FL — SIGNIFICANT CHANGE UP (ref 80–100)
MONOCYTES # BLD AUTO: 0.87 K/UL — SIGNIFICANT CHANGE UP (ref 0–0.9)
MONOCYTES # BLD AUTO: 1.48 K/UL — HIGH (ref 0–0.9)
MONOCYTES NFR BLD AUTO: 7.9 % — SIGNIFICANT CHANGE UP (ref 2–14)
MONOCYTES NFR BLD AUTO: 9.5 % — SIGNIFICANT CHANGE UP (ref 2–14)
NEUTROPHILS # BLD AUTO: 13.26 K/UL — HIGH (ref 1.8–7.4)
NEUTROPHILS # BLD AUTO: 6.91 K/UL — SIGNIFICANT CHANGE UP (ref 1.8–7.4)
NEUTROPHILS NFR BLD AUTO: 62.5 % — SIGNIFICANT CHANGE UP (ref 43–77)
NEUTROPHILS NFR BLD AUTO: 85.3 % — HIGH (ref 43–77)
NITRITE UR-MCNC: NEGATIVE — SIGNIFICANT CHANGE UP
NITRITE UR-MCNC: NEGATIVE — SIGNIFICANT CHANGE UP
NRBC # BLD: 0 /100 WBCS — SIGNIFICANT CHANGE UP (ref 0–0)
PCO2 BLDV: 57 MMHG — HIGH (ref 39–42)
PH BLDV: 7.35 — SIGNIFICANT CHANGE UP (ref 7.32–7.43)
PH UR: 6 — SIGNIFICANT CHANGE UP (ref 5–8)
PH UR: 6 — SIGNIFICANT CHANGE UP (ref 5–8)
PLAT MORPH BLD: NORMAL — SIGNIFICANT CHANGE UP
PLATELET # BLD AUTO: 341 K/UL — SIGNIFICANT CHANGE UP (ref 150–400)
PLATELET # BLD AUTO: 366 K/UL — SIGNIFICANT CHANGE UP (ref 150–400)
PLATELET # BLD AUTO: 393 K/UL — SIGNIFICANT CHANGE UP (ref 150–400)
PLATELET # BLD AUTO: 444 K/UL — HIGH (ref 150–400)
PO2 BLDV: 18 MMHG — LOW (ref 25–45)
POTASSIUM BLDV-SCNC: 7.9 MMOL/L — CRITICAL HIGH (ref 3.5–5.1)
POTASSIUM SERPL-MCNC: 2.9 MMOL/L — CRITICAL LOW (ref 3.5–5.3)
POTASSIUM SERPL-MCNC: 3.1 MMOL/L — LOW (ref 3.5–5.3)
POTASSIUM SERPL-MCNC: 3.2 MMOL/L — LOW (ref 3.5–5.3)
POTASSIUM SERPL-MCNC: 3.5 MMOL/L — SIGNIFICANT CHANGE UP (ref 3.5–5.3)
POTASSIUM SERPL-MCNC: 3.6 MMOL/L — SIGNIFICANT CHANGE UP (ref 3.5–5.3)
POTASSIUM SERPL-MCNC: 3.9 MMOL/L — SIGNIFICANT CHANGE UP (ref 3.5–5.3)
POTASSIUM SERPL-MCNC: 5.2 MMOL/L — SIGNIFICANT CHANGE UP (ref 3.5–5.3)
POTASSIUM SERPL-SCNC: 2.9 MMOL/L — CRITICAL LOW (ref 3.5–5.3)
POTASSIUM SERPL-SCNC: 3.1 MMOL/L — LOW (ref 3.5–5.3)
POTASSIUM SERPL-SCNC: 3.2 MMOL/L — LOW (ref 3.5–5.3)
POTASSIUM SERPL-SCNC: 3.5 MMOL/L — SIGNIFICANT CHANGE UP (ref 3.5–5.3)
POTASSIUM SERPL-SCNC: 3.6 MMOL/L — SIGNIFICANT CHANGE UP (ref 3.5–5.3)
POTASSIUM SERPL-SCNC: 3.9 MMOL/L — SIGNIFICANT CHANGE UP (ref 3.5–5.3)
POTASSIUM SERPL-SCNC: 5.2 MMOL/L — SIGNIFICANT CHANGE UP (ref 3.5–5.3)
PROT ?TM UR-MCNC: 16 MG/DL — HIGH (ref 0–12)
PROT SERPL-MCNC: 7.1 G/DL — SIGNIFICANT CHANGE UP (ref 6–8.3)
PROT SERPL-MCNC: 7.2 G/DL — SIGNIFICANT CHANGE UP (ref 6–8.3)
PROT UR-MCNC: ABNORMAL
PROT UR-MCNC: ABNORMAL
PROT/CREAT UR-RTO: 0.4 RATIO — HIGH (ref 0–0.2)
RBC # BLD: 3.85 M/UL — SIGNIFICANT CHANGE UP (ref 3.8–5.2)
RBC # BLD: 3.91 M/UL — SIGNIFICANT CHANGE UP (ref 3.8–5.2)
RBC # BLD: 4.09 M/UL — SIGNIFICANT CHANGE UP (ref 3.8–5.2)
RBC # BLD: 4.4 M/UL — SIGNIFICANT CHANGE UP (ref 3.8–5.2)
RBC # FLD: 14.6 % — HIGH (ref 10.3–14.5)
RBC # FLD: 19.7 % — HIGH (ref 10.3–14.5)
RBC BLD AUTO: SIGNIFICANT CHANGE UP
RBC CASTS # UR COMP ASSIST: 3 /HPF — SIGNIFICANT CHANGE UP (ref 0–4)
RBC CASTS # UR COMP ASSIST: 3 /HPF — SIGNIFICANT CHANGE UP (ref 0–4)
SAO2 % BLDV: 26 % — LOW (ref 67–88)
SARS-COV-2 RNA SPEC QL NAA+PROBE: DETECTED
SARS-COV-2 RNA SPEC QL NAA+PROBE: SIGNIFICANT CHANGE UP
SODIUM SERPL-SCNC: 120 MMOL/L — CRITICAL LOW (ref 135–145)
SODIUM SERPL-SCNC: 132 MMOL/L — LOW (ref 135–145)
SODIUM SERPL-SCNC: 132 MMOL/L — LOW (ref 135–145)
SODIUM SERPL-SCNC: 133 MMOL/L — LOW (ref 135–145)
SODIUM SERPL-SCNC: 134 MMOL/L — LOW (ref 135–145)
SODIUM SERPL-SCNC: 135 MMOL/L — SIGNIFICANT CHANGE UP (ref 135–145)
SODIUM SERPL-SCNC: 135 MMOL/L — SIGNIFICANT CHANGE UP (ref 135–145)
SODIUM UR-SCNC: 63 MMOL/L — SIGNIFICANT CHANGE UP
SP GR SPEC: 1.01 — SIGNIFICANT CHANGE UP (ref 1.01–1.02)
SP GR SPEC: 1.02 — SIGNIFICANT CHANGE UP (ref 1.01–1.02)
SPECIMEN SOURCE: SIGNIFICANT CHANGE UP
UROBILINOGEN FLD QL: NEGATIVE — SIGNIFICANT CHANGE UP
UROBILINOGEN FLD QL: NEGATIVE — SIGNIFICANT CHANGE UP
WBC # BLD: 11.07 K/UL — HIGH (ref 3.8–10.5)
WBC # BLD: 15.54 K/UL — HIGH (ref 3.8–10.5)
WBC # BLD: 7.34 K/UL — SIGNIFICANT CHANGE UP (ref 3.8–10.5)
WBC # BLD: 9.82 K/UL — SIGNIFICANT CHANGE UP (ref 3.8–10.5)
WBC # FLD AUTO: 11.07 K/UL — HIGH (ref 3.8–10.5)
WBC # FLD AUTO: 15.54 K/UL — HIGH (ref 3.8–10.5)
WBC # FLD AUTO: 7.34 K/UL — SIGNIFICANT CHANGE UP (ref 3.8–10.5)
WBC # FLD AUTO: 9.82 K/UL — SIGNIFICANT CHANGE UP (ref 3.8–10.5)
WBC UR QL: 211 /HPF — HIGH (ref 0–5)
WBC UR QL: 76 /HPF — HIGH (ref 0–5)

## 2021-01-01 PROCEDURE — 84156 ASSAY OF PROTEIN URINE: CPT

## 2021-01-01 PROCEDURE — 74176 CT ABD & PELVIS W/O CONTRAST: CPT | Mod: MA

## 2021-01-01 PROCEDURE — 83735 ASSAY OF MAGNESIUM: CPT

## 2021-01-01 PROCEDURE — 82330 ASSAY OF CALCIUM: CPT

## 2021-01-01 PROCEDURE — 82803 BLOOD GASES ANY COMBINATION: CPT

## 2021-01-01 PROCEDURE — 93296 REM INTERROG EVL PM/IDS: CPT

## 2021-01-01 PROCEDURE — U0005: CPT

## 2021-01-01 PROCEDURE — 97110 THERAPEUTIC EXERCISES: CPT

## 2021-01-01 PROCEDURE — 97162 PT EVAL MOD COMPLEX 30 MIN: CPT

## 2021-01-01 PROCEDURE — 93005 ELECTROCARDIOGRAM TRACING: CPT

## 2021-01-01 PROCEDURE — 84132 ASSAY OF SERUM POTASSIUM: CPT

## 2021-01-01 PROCEDURE — 83605 ASSAY OF LACTIC ACID: CPT

## 2021-01-01 PROCEDURE — 81001 URINALYSIS AUTO W/SCOPE: CPT

## 2021-01-01 PROCEDURE — 82570 ASSAY OF URINE CREATININE: CPT

## 2021-01-01 PROCEDURE — 87086 URINE CULTURE/COLONY COUNT: CPT

## 2021-01-01 PROCEDURE — 97530 THERAPEUTIC ACTIVITIES: CPT

## 2021-01-01 PROCEDURE — 80053 COMPREHEN METABOLIC PANEL: CPT

## 2021-01-01 PROCEDURE — 83690 ASSAY OF LIPASE: CPT

## 2021-01-01 PROCEDURE — 82435 ASSAY OF BLOOD CHLORIDE: CPT

## 2021-01-01 PROCEDURE — 85018 HEMOGLOBIN: CPT

## 2021-01-01 PROCEDURE — 85027 COMPLETE CBC AUTOMATED: CPT

## 2021-01-01 PROCEDURE — 82962 GLUCOSE BLOOD TEST: CPT

## 2021-01-01 PROCEDURE — 93010 ELECTROCARDIOGRAM REPORT: CPT

## 2021-01-01 PROCEDURE — 99285 EMERGENCY DEPT VISIT HI MDM: CPT | Mod: CS

## 2021-01-01 PROCEDURE — 85014 HEMATOCRIT: CPT

## 2021-01-01 PROCEDURE — 87040 BLOOD CULTURE FOR BACTERIA: CPT

## 2021-01-01 PROCEDURE — 80048 BASIC METABOLIC PNL TOTAL CA: CPT

## 2021-01-01 PROCEDURE — 82436 ASSAY OF URINE CHLORIDE: CPT

## 2021-01-01 PROCEDURE — U0003: CPT

## 2021-01-01 PROCEDURE — 85025 COMPLETE CBC W/AUTO DIFF WBC: CPT

## 2021-01-01 PROCEDURE — 93294 REM INTERROG EVL PM/LDLS PM: CPT

## 2021-01-01 PROCEDURE — 84300 ASSAY OF URINE SODIUM: CPT

## 2021-01-01 PROCEDURE — 99285 EMERGENCY DEPT VISIT HI MDM: CPT | Mod: 25

## 2021-01-01 PROCEDURE — 71045 X-RAY EXAM CHEST 1 VIEW: CPT | Mod: 26

## 2021-01-01 PROCEDURE — 96374 THER/PROPH/DIAG INJ IV PUSH: CPT

## 2021-01-01 PROCEDURE — 84295 ASSAY OF SERUM SODIUM: CPT

## 2021-01-01 PROCEDURE — 71045 X-RAY EXAM CHEST 1 VIEW: CPT

## 2021-01-01 PROCEDURE — 36415 COLL VENOUS BLD VENIPUNCTURE: CPT

## 2021-01-01 PROCEDURE — 74176 CT ABD & PELVIS W/O CONTRAST: CPT | Mod: 26

## 2021-01-01 PROCEDURE — 82947 ASSAY GLUCOSE BLOOD QUANT: CPT

## 2021-01-01 PROCEDURE — 99285 EMERGENCY DEPT VISIT HI MDM: CPT

## 2021-01-01 RX ORDER — ZOLPIDEM TARTRATE 10 MG/1
5 TABLET ORAL AT BEDTIME
Refills: 0 | Status: DISCONTINUED | OUTPATIENT
Start: 2021-01-01 | End: 2021-01-01

## 2021-01-01 RX ORDER — SODIUM CHLORIDE 9 MG/ML
1000 INJECTION INTRAMUSCULAR; INTRAVENOUS; SUBCUTANEOUS ONCE
Refills: 0 | Status: COMPLETED | OUTPATIENT
Start: 2021-01-01 | End: 2021-01-01

## 2021-01-01 RX ORDER — CEFTRIAXONE 500 MG/1
1000 INJECTION, POWDER, FOR SOLUTION INTRAMUSCULAR; INTRAVENOUS EVERY 24 HOURS
Refills: 0 | Status: DISCONTINUED | OUTPATIENT
Start: 2021-01-01 | End: 2021-01-01

## 2021-01-01 RX ORDER — SODIUM CHLORIDE 9 MG/ML
1000 INJECTION INTRAMUSCULAR; INTRAVENOUS; SUBCUTANEOUS
Refills: 0 | Status: COMPLETED | OUTPATIENT
Start: 2021-01-01 | End: 2021-01-01

## 2021-01-01 RX ORDER — ACETAMINOPHEN 500 MG
2 TABLET ORAL
Qty: 0 | Refills: 0 | DISCHARGE
Start: 2021-01-01

## 2021-01-01 RX ORDER — AMLODIPINE BESYLATE 2.5 MG/1
5 TABLET ORAL DAILY
Refills: 0 | Status: DISCONTINUED | OUTPATIENT
Start: 2021-01-01 | End: 2021-01-01

## 2021-01-01 RX ORDER — METOPROLOL TARTRATE 50 MG
1 TABLET ORAL
Qty: 0 | Refills: 0 | DISCHARGE

## 2021-01-01 RX ORDER — FOLIC ACID 0.8 MG
1 TABLET ORAL
Qty: 0 | Refills: 0 | DISCHARGE

## 2021-01-01 RX ORDER — RIVAROXABAN 15 MG-20MG
10 KIT ORAL DAILY
Refills: 0 | Status: DISCONTINUED | OUTPATIENT
Start: 2021-01-01 | End: 2021-01-01

## 2021-01-01 RX ORDER — SIMVASTATIN 20 MG/1
1 TABLET, FILM COATED ORAL
Qty: 0 | Refills: 0 | DISCHARGE

## 2021-01-01 RX ORDER — SODIUM CHLORIDE 9 MG/ML
1000 INJECTION INTRAMUSCULAR; INTRAVENOUS; SUBCUTANEOUS
Refills: 0 | Status: DISCONTINUED | OUTPATIENT
Start: 2021-01-01 | End: 2021-01-01

## 2021-01-01 RX ORDER — ACETAMINOPHEN 500 MG
650 TABLET ORAL EVERY 6 HOURS
Refills: 0 | Status: DISCONTINUED | OUTPATIENT
Start: 2021-01-01 | End: 2022-01-01

## 2021-01-01 RX ORDER — ASPIRIN/CALCIUM CARB/MAGNESIUM 324 MG
81 TABLET ORAL DAILY
Refills: 0 | Status: DISCONTINUED | OUTPATIENT
Start: 2021-01-01 | End: 2021-01-01

## 2021-01-01 RX ORDER — ACETAMINOPHEN 500 MG
650 TABLET ORAL ONCE
Refills: 0 | Status: COMPLETED | OUTPATIENT
Start: 2021-01-01 | End: 2021-01-01

## 2021-01-01 RX ORDER — POTASSIUM CHLORIDE 20 MEQ
40 PACKET (EA) ORAL ONCE
Refills: 0 | Status: COMPLETED | OUTPATIENT
Start: 2021-01-01 | End: 2021-01-01

## 2021-01-01 RX ORDER — CEFTRIAXONE 500 MG/1
1000 INJECTION, POWDER, FOR SOLUTION INTRAMUSCULAR; INTRAVENOUS ONCE
Refills: 0 | Status: COMPLETED | OUTPATIENT
Start: 2021-01-01 | End: 2021-01-01

## 2021-01-01 RX ORDER — ENOXAPARIN SODIUM 100 MG/ML
40 INJECTION SUBCUTANEOUS DAILY
Refills: 0 | Status: DISCONTINUED | OUTPATIENT
Start: 2021-01-01 | End: 2022-01-01

## 2021-01-01 RX ORDER — SODIUM CHLORIDE 9 MG/ML
500 INJECTION INTRAMUSCULAR; INTRAVENOUS; SUBCUTANEOUS
Refills: 0 | Status: DISCONTINUED | OUTPATIENT
Start: 2021-01-01 | End: 2021-01-01

## 2021-01-01 RX ORDER — NYSTATIN CREAM 100000 [USP'U]/G
1 CREAM TOPICAL THREE TIMES A DAY
Refills: 0 | Status: DISCONTINUED | OUTPATIENT
Start: 2021-01-01 | End: 2021-01-01

## 2021-01-01 RX ORDER — PANTOPRAZOLE SODIUM 20 MG/1
40 TABLET, DELAYED RELEASE ORAL
Refills: 0 | Status: DISCONTINUED | OUTPATIENT
Start: 2021-01-01 | End: 2022-01-01

## 2021-01-01 RX ORDER — PANTOPRAZOLE SODIUM 20 MG/1
40 TABLET, DELAYED RELEASE ORAL
Refills: 0 | Status: DISCONTINUED | OUTPATIENT
Start: 2021-01-01 | End: 2021-01-01

## 2021-01-01 RX ORDER — AMLODIPINE BESYLATE 2.5 MG/1
5 TABLET ORAL DAILY
Refills: 0 | Status: DISCONTINUED | OUTPATIENT
Start: 2021-01-01 | End: 2022-01-01

## 2021-01-01 RX ORDER — SIMVASTATIN 20 MG/1
20 TABLET, FILM COATED ORAL AT BEDTIME
Refills: 0 | Status: DISCONTINUED | OUTPATIENT
Start: 2021-01-01 | End: 2021-01-01

## 2021-01-01 RX ORDER — METOPROLOL TARTRATE 50 MG
100 TABLET ORAL
Refills: 0 | Status: DISCONTINUED | OUTPATIENT
Start: 2021-01-01 | End: 2022-01-01

## 2021-01-01 RX ORDER — CEFTRIAXONE 500 MG/1
1000 INJECTION, POWDER, FOR SOLUTION INTRAMUSCULAR; INTRAVENOUS EVERY 24 HOURS
Refills: 0 | Status: DISCONTINUED | OUTPATIENT
Start: 2021-01-01 | End: 2022-01-01

## 2021-01-01 RX ORDER — AMLODIPINE BESYLATE 2.5 MG/1
1 TABLET ORAL
Qty: 0 | Refills: 0 | DISCHARGE

## 2021-01-01 RX ORDER — SODIUM CHLORIDE 9 MG/ML
250 INJECTION INTRAMUSCULAR; INTRAVENOUS; SUBCUTANEOUS ONCE
Refills: 0 | Status: COMPLETED | OUTPATIENT
Start: 2021-01-01 | End: 2021-01-01

## 2021-01-01 RX ORDER — METOPROLOL TARTRATE 50 MG
100 TABLET ORAL
Refills: 0 | Status: DISCONTINUED | OUTPATIENT
Start: 2021-01-01 | End: 2021-01-01

## 2021-01-01 RX ORDER — ACETAMINOPHEN 500 MG
650 TABLET ORAL EVERY 6 HOURS
Refills: 0 | Status: DISCONTINUED | OUTPATIENT
Start: 2021-01-01 | End: 2021-01-01

## 2021-01-01 RX ORDER — OMEPRAZOLE 10 MG/1
1 CAPSULE, DELAYED RELEASE ORAL
Qty: 0 | Refills: 0 | DISCHARGE

## 2021-01-01 RX ORDER — METOPROLOL TARTRATE 50 MG
25 TABLET ORAL
Refills: 0 | Status: DISCONTINUED | OUTPATIENT
Start: 2021-01-01 | End: 2021-01-01

## 2021-01-01 RX ADMIN — Medication 40 MILLIEQUIVALENT(S): at 15:32

## 2021-01-01 RX ADMIN — Medication 81 MILLIGRAM(S): at 12:47

## 2021-01-01 RX ADMIN — CEFTRIAXONE 100 MILLIGRAM(S): 500 INJECTION, POWDER, FOR SOLUTION INTRAMUSCULAR; INTRAVENOUS at 12:10

## 2021-01-01 RX ADMIN — AMLODIPINE BESYLATE 5 MILLIGRAM(S): 2.5 TABLET ORAL at 05:10

## 2021-01-01 RX ADMIN — Medication 81 MILLIGRAM(S): at 11:57

## 2021-01-01 RX ADMIN — CEFTRIAXONE 100 MILLIGRAM(S): 500 INJECTION, POWDER, FOR SOLUTION INTRAMUSCULAR; INTRAVENOUS at 15:10

## 2021-01-01 RX ADMIN — Medication 100 MILLIGRAM(S): at 05:31

## 2021-01-01 RX ADMIN — AMLODIPINE BESYLATE 5 MILLIGRAM(S): 2.5 TABLET ORAL at 05:50

## 2021-01-01 RX ADMIN — NYSTATIN CREAM 1 APPLICATION(S): 100000 CREAM TOPICAL at 21:45

## 2021-01-01 RX ADMIN — Medication 100 MILLIGRAM(S): at 05:50

## 2021-01-01 RX ADMIN — Medication 100 MILLIGRAM(S): at 18:01

## 2021-01-01 RX ADMIN — PANTOPRAZOLE SODIUM 40 MILLIGRAM(S): 20 TABLET, DELAYED RELEASE ORAL at 05:09

## 2021-01-01 RX ADMIN — NYSTATIN CREAM 1 APPLICATION(S): 100000 CREAM TOPICAL at 13:28

## 2021-01-01 RX ADMIN — CEFTRIAXONE 100 MILLIGRAM(S): 500 INJECTION, POWDER, FOR SOLUTION INTRAMUSCULAR; INTRAVENOUS at 12:23

## 2021-01-01 RX ADMIN — Medication 650 MILLIGRAM(S): at 12:27

## 2021-01-01 RX ADMIN — Medication 650 MILLIGRAM(S): at 11:57

## 2021-01-01 RX ADMIN — ZOLPIDEM TARTRATE 5 MILLIGRAM(S): 10 TABLET ORAL at 21:51

## 2021-01-01 RX ADMIN — Medication 650 MILLIGRAM(S): at 08:22

## 2021-01-01 RX ADMIN — Medication 650 MILLIGRAM(S): at 19:34

## 2021-01-01 RX ADMIN — Medication 650 MILLIGRAM(S): at 20:13

## 2021-01-01 RX ADMIN — CEFTRIAXONE 100 MILLIGRAM(S): 500 INJECTION, POWDER, FOR SOLUTION INTRAMUSCULAR; INTRAVENOUS at 11:57

## 2021-01-01 RX ADMIN — ZOLPIDEM TARTRATE 5 MILLIGRAM(S): 10 TABLET ORAL at 21:55

## 2021-01-01 RX ADMIN — NYSTATIN CREAM 1 APPLICATION(S): 100000 CREAM TOPICAL at 05:33

## 2021-01-01 RX ADMIN — Medication 100 MILLIGRAM(S): at 05:09

## 2021-01-01 RX ADMIN — NYSTATIN CREAM 1 APPLICATION(S): 100000 CREAM TOPICAL at 05:09

## 2021-01-01 RX ADMIN — Medication 650 MILLIGRAM(S): at 07:52

## 2021-01-01 RX ADMIN — NYSTATIN CREAM 1 APPLICATION(S): 100000 CREAM TOPICAL at 13:11

## 2021-01-01 RX ADMIN — CEFTRIAXONE 1000 MILLIGRAM(S): 500 INJECTION, POWDER, FOR SOLUTION INTRAMUSCULAR; INTRAVENOUS at 19:35

## 2021-01-01 RX ADMIN — Medication 81 MILLIGRAM(S): at 12:10

## 2021-01-01 RX ADMIN — Medication 650 MILLIGRAM(S): at 20:43

## 2021-01-01 RX ADMIN — SODIUM CHLORIDE 1000 MILLILITER(S): 9 INJECTION INTRAMUSCULAR; INTRAVENOUS; SUBCUTANEOUS at 11:47

## 2021-01-01 RX ADMIN — NYSTATIN CREAM 1 APPLICATION(S): 100000 CREAM TOPICAL at 22:14

## 2021-01-01 RX ADMIN — PANTOPRAZOLE SODIUM 40 MILLIGRAM(S): 20 TABLET, DELAYED RELEASE ORAL at 05:50

## 2021-01-01 RX ADMIN — NYSTATIN CREAM 1 APPLICATION(S): 100000 CREAM TOPICAL at 14:17

## 2021-01-01 RX ADMIN — SODIUM CHLORIDE 50 MILLILITER(S): 9 INJECTION INTRAMUSCULAR; INTRAVENOUS; SUBCUTANEOUS at 21:56

## 2021-01-01 RX ADMIN — SODIUM CHLORIDE 50 MILLILITER(S): 9 INJECTION INTRAMUSCULAR; INTRAVENOUS; SUBCUTANEOUS at 12:45

## 2021-01-01 RX ADMIN — SODIUM CHLORIDE 75 MILLILITER(S): 9 INJECTION INTRAMUSCULAR; INTRAVENOUS; SUBCUTANEOUS at 12:23

## 2021-01-01 RX ADMIN — Medication 650 MILLIGRAM(S): at 18:42

## 2021-01-01 RX ADMIN — Medication 100 MILLIGRAM(S): at 17:33

## 2021-01-01 RX ADMIN — Medication 100 MILLIGRAM(S): at 17:26

## 2021-01-01 RX ADMIN — SIMVASTATIN 20 MILLIGRAM(S): 20 TABLET, FILM COATED ORAL at 22:13

## 2021-01-01 RX ADMIN — SIMVASTATIN 20 MILLIGRAM(S): 20 TABLET, FILM COATED ORAL at 21:44

## 2021-01-01 RX ADMIN — CEFTRIAXONE 100 MILLIGRAM(S): 500 INJECTION, POWDER, FOR SOLUTION INTRAMUSCULAR; INTRAVENOUS at 12:46

## 2021-01-01 RX ADMIN — Medication 100 MILLIGRAM(S): at 18:33

## 2021-01-01 RX ADMIN — SODIUM CHLORIDE 75 MILLILITER(S): 9 INJECTION INTRAMUSCULAR; INTRAVENOUS; SUBCUTANEOUS at 21:44

## 2021-01-01 RX ADMIN — PANTOPRAZOLE SODIUM 40 MILLIGRAM(S): 20 TABLET, DELAYED RELEASE ORAL at 05:31

## 2021-01-01 RX ADMIN — AMLODIPINE BESYLATE 5 MILLIGRAM(S): 2.5 TABLET ORAL at 05:31

## 2021-01-01 RX ADMIN — ZOLPIDEM TARTRATE 5 MILLIGRAM(S): 10 TABLET ORAL at 22:14

## 2021-07-30 NOTE — ED ADULT TRIAGE NOTE - TEMPERATURE IN CELSIUS (DEGREES C)
"Subjective:      Phan Bradley is a 11 y.o. male who presents with Other (SPORTS PHYSICAL )    Pt PMH, SocHx, SurgHx, FamHx, Drug allergies and medications reviewed with pt/EPIC.      Family history reviewed, it is not pertinent to this complaint.           Sports physical    Other  Pertinent negatives include no abdominal pain, chest pain, congestion, coughing, fever, headaches, myalgias, rash or sore throat.       Review of Systems   Constitutional: Negative for fever.   HENT: Negative for congestion, ear pain and sore throat.    Eyes: Negative for blurred vision, double vision and discharge.   Respiratory: Negative for cough, shortness of breath and wheezing.    Cardiovascular: Negative for chest pain, palpitations and leg swelling.   Gastrointestinal: Negative for abdominal pain and blood in stool.   Genitourinary: Negative for dysuria and hematuria.   Musculoskeletal: Negative for joint pain and myalgias.   Skin: Negative for rash.   Neurological: Negative for dizziness, sensory change, focal weakness, seizures and headaches.   Endo/Heme/Allergies: Does not bruise/bleed easily.   Psychiatric/Behavioral: Negative for depression, substance abuse and suicidal ideas.   All other systems reviewed and are negative.         Objective:     /82 (BP Location: Left arm, Patient Position: Sitting, BP Cuff Size: Small adult)   Pulse 105   Temp 36.2 °C (97.2 °F) (Temporal)   Resp 22   Ht 1.575 m (5' 2\")   Wt 66.7 kg (147 lb)   SpO2 99%   BMI 26.89 kg/m²      Physical Exam         See scanned documents for exam results.           Assessment/Plan:            1. Sports physical       Pt is cleared for sports without restrictions.      " 36.9

## 2021-11-11 NOTE — H&P ADULT - NSICDXPASTSURGICALHX_GEN_ALL_CORE_FT
[facility] Note     Subjective:  Krista Antonio is here for his first postoperative visit. He underwent a laparoscopic cholecystectomy by Dr. Kemp on 2/10/17. Today he  tells me he has been feeling well since surgery. He currently does not require narcotic pain medications, he is eating a normal diet and his bowels are regular. He has no concerns today.    Objective:  Abd - soft, non-tender, non-distended, +bowel sounds, no masses or organomegaly   Inc - c/d/i, no erythema, +healing ridge, no masses    Assessment:  S/p laparoscopic cholecystectomy. The pathology confirms chronic cholecystitis and cholelithiasis.    Plan:  RTC PRN      Cecilio Ness PA-C  2/23/2017      Please route or send letter to:  Primary Care Provider (PCP)         PAST SURGICAL HISTORY:  History of Appendectomy     History of Colon Resection     Open fracture of right hip ORIF of Rt Hip (2011)    Pacemaker     S/P LAVERNE (Total Abdominal Hysterectomy)

## 2021-11-11 NOTE — ED ADULT NURSE NOTE - NSICDXPASTSURGICALHX_GEN_ALL_CORE_FT
PAST SURGICAL HISTORY:  History of Appendectomy     History of Colon Resection     Open fracture of right hip ORIF of Rt Hip (2011)    Pacemaker     S/P LAVERNE (Total Abdominal Hysterectomy)

## 2021-11-11 NOTE — H&P ADULT - HISTORY OF PRESENT ILLNESS
89 y/o Swiss speaking female with PMHx of A. fib, HTN, GERD, presents to the ED sent in by PCP for elevated WBC count. Patient was seen in office yesterday to have her labs done. Patient is currently being treated for UTI taking Cipro for past five days. As per daughter patient does not seem to be feeling better. Daughter states that she has been very lethargic and is not getting out of bed the past few days. SHe is complaining that her "legs hurt her". She states that she noticed a foul smell to the urine about 1 week ago which has not improved. Daughter states that she has been eating normally but not drinking enough fluids. Admits to chills but denies fever. No headache, chest pain, cough, difficulty breathing, abdominal pain, n/v/d. No hematuria.    	Offered  patient prefers daughter to translate at bedside

## 2021-11-11 NOTE — H&P ADULT - NSHPPHYSICALEXAM_GEN_ALL_CORE
Vital Signs Last 24 Hrs  T(C): 36.8 (11 Nov 2021 12:05), Max: 36.8 (11 Nov 2021 11:08)  T(F): 98.3 (11 Nov 2021 12:05), Max: 98.3 (11 Nov 2021 11:08)  HR: 73 (11 Nov 2021 12:05) (73 - 89)  BP: 115/78 (11 Nov 2021 12:05) (115/78 - 145/85)  BP(mean): 88 (11 Nov 2021 12:05) (88 - 88)  RR: 18 (11 Nov 2021 12:05) (18 - 18)  SpO2: 98% (11 Nov 2021 12:05) (98% - 98%) Vital Signs Last 24 Hrs  T(C): 36.8 (11 Nov 2021 12:05), Max: 36.8 (11 Nov 2021 11:08)  T(F): 98.3 (11 Nov 2021 12:05), Max: 98.3 (11 Nov 2021 11:08)  HR: 73 (11 Nov 2021 12:05) (73 - 89)  BP: 115/78 (11 Nov 2021 12:05) (115/78 - 145/85)  BP(mean): 88 (11 Nov 2021 12:05) (88 - 88)  RR: 18 (11 Nov 2021 12:05) (18 - 18)  SpO2: 98% (11 Nov 2021 12:05) (98% - 98%)    PHYSICAL EXAM:  GENERAL: NAD, well-developed  HEAD:  Atraumatic, Normocephalic  EYES: EOMI, PERRLA, conjunctiva and sclera clear  NECK: Supple, No JVD  CHEST/LUNG: Clear to auscultation bilaterally; No wheeze  HEART: Regular rate and rhythm; No murmurs, rubs, or gallops  ABDOMEN: Soft, Nontender, Nondistended; Bowel sounds present  EXTREMITIES:  2+ Peripheral Pulses, No clubbing, cyanosis, or edema  PSYCH: AAOx3  NEUROLOGY: non-focal  SKIN: No rashes or lesions

## 2021-11-11 NOTE — ED PROVIDER NOTE - PHYSICAL EXAMINATION
CONSTITUTIONAL: Patient is awake, alert and oriented to self and place; Patient is well appearing and in no acute distress.  HEAD: NCAT   ENT: Airway patent, dry oral mucosa  NECK: Supple, No LAD,  LUNGS: CTA B/L, no wheezes, rhonci or rales  HEART: RRR.+S1S2 no murmurs,   ABDOMEN: Soft, (+) left sided abdominal tenderness to palpation; non-distended, (-) CVAT b/l;   EXTREMITY: No edema or calf tenderness b/l, FROM upper and lower ext b/l,   SKIN: No rash or lesions  NEURO: No focal deficits,

## 2021-11-11 NOTE — ED PROVIDER NOTE - PROGRESS NOTE DETAILS
Creat elevated to 2.01. Discussed these results with daughter. Patient given IVF bolus, and IV Rocephin. Patient to be admitted to the hospital for further management. Pending CT scan abdomen and pelvis. Renetta Hoffman PA-C

## 2021-11-11 NOTE — CONSULT NOTE ADULT - SUBJECTIVE AND OBJECTIVE BOX
Problem: Patient Care Overview  Goal: Plan of Care Review  Outcome: Ongoing (interventions implemented as appropriate)  Patient aaox4. Bed kept locked, lowest position with 2x side rails up while in bed. nonslip footwear when out of bed. Ambulates with standby assistance and use of walker. Worked with PT this afternoon.  at bedside, attentive to patient's needs. Low potassium renal diet. accuchecks ac/hs, insulin given as ordered. Telemetry monitoring SR-ST, increased with ambulation. Abdomen distended. Chevron dedrick with staples, painted with betadine. Previous right VALORIE site leaking, ABD pad in place. +4 bilateral lower extremity edema, OLAF and ambulation encouraged. 24hour urine collection started at 1100 this morning, to be completed tomorrow at 1100. H/H 7.5/22.7, received 1unit PRBC this afternoon. Contact precautions maintained.        Patient is a 89y old  Female who presents with a chief complaint of UTI     HPI:  89 y/o Guinean speaking female with PMHx of A. fib, HTN, GERD, presents to the ED sent in by PCP for elevated WBC count. Patient was seen in office by PCP  to have her labs done. Patient is currently being treated for UTI taking Cipro for past five days. The daughter reports that her mother was having a foul odor in urine about 1 week ago which has not improved.  Admits to chills but denies fever. No headache, chest pain, cough, difficulty breathing, abdominal pain, n/v/d. No hematuria. The daughter reports that her mother was taking advil 2 tabs BID for last 5 days. No known renal issues, has never seen an outpatient nephrologist     	  PAST MEDICAL & SURGICAL HISTORY:  Acute Coronary Syndrome    Atrial Fibrillation    Hypertension    Renal Calculi    History of Colon Cancer    S/P Cardiac Pacemaker Procedure    Tachy-Francis Syndrome    Chronic Constipation    H/O gastroesophageal reflux (GERD)    History of Appendectomy    S/P LAVERNE (Total Abdominal Hysterectomy)    History of Colon Resection    Open fracture of right hip  ORIF of Rt Hip (2011)    Pacemaker        MEDICATIONS  (STANDING):  cefTRIAXone   IVPB 1000 milliGRAM(s) IV Intermittent every 24 hours  sodium chloride 0.9%. 500 milliLiter(s) (75 mL/Hr) IV Continuous <Continuous>      Allergies    Demerol HCl (Vomiting)    Intolerances    meperidine (Stomach Upset)      SOCIAL HISTORY:  Denies ETOh,Smoking,     FAMILY HISTORY:  No pertinent family history in first degree relatives        REVIEW OF SYSTEMS:  CONSTITUTIONAL: No weakness, fevers or chills  EYES/ENT: No visual changes;  No vertigo or throat pain   NECK: No pain or stiffness  RESPIRATORY: No cough, wheezing, hemoptysis; No shortness of breath  CARDIOVASCULAR: No chest pain or palpitations  GASTROINTESTINAL: No abdominal or epigastric pain. No nausea, vomiting, or hematemesis; No diarrhea or constipation. No melena or hematochezia.  GENITOURINARY: No dysuria, frequency or hematuria  NEUROLOGICAL: No numbness or weakness  SKIN: No itching, burning, rashes, or lesions   All other review of systems is negative unless indicated above.    VITAL:  T(C): , Max: 36.8 (11-11-21 @ 11:08)  T(F): , Max: 98.3 (11-11-21 @ 11:08)  HR: 73 (11-11-21 @ 12:05)  BP: 115/78 (11-11-21 @ 12:05)  BP(mean): 88 (11-11-21 @ 12:05)  RR: 18 (11-11-21 @ 12:05)  SpO2: 98% (11-11-21 @ 12:05)  Wt(kg): --    PHYSICAL EXAM:  Constitutional: NAD, Alert  HEENT: NCAT, MMM  Neck: Supple, No JVD  Respiratory: CTA-b/l  Cardiovascular: RRR s1s2, no m/r/g  Gastrointestinal: BS+, soft, NT/ND  Extremities: No peripheral edema b/l  Neurological: no focal deficits; strength grossly intact  Back: no CVAT b/l  Skin: No rashes, no nevi    LABS:                        12.4   11.07 )-----------( 444      ( 11 Nov 2021 12:09 )             39.2     Na(133)/K(3.5)/Cl(91)/HCO3(27)/BUN(23)/Cr(2.01)Glu(92)/Ca(9.8)/Mg(2.2)/PO4(--)    11-11 @ 12:09      ASSESSMENT:  89 y/o Guinean speaking female with PMHx of A. fib, HTN, GERD, presents to the ED sent in by PCP for elevated WBC count in the setting of UTI?     CKDIII/IV  vs non oliguric MICHAEL likely pre renal given poor appetite,  Mild hyponatremia-   CLinical euvolemia  UTI ? on ceftraixone       RECOMMEND:  C/w NS at 75 cc/hour   Send UA, urine lytes, urine culture, urine protein   Dose meds for Cr Cl 20 ml/min   avoid contrast studies as able  Defer renal imaging   Post void residual   Avoid contrast studies/NSAIDs as able     Thank you for involving [facility] in this patient's care.    With warm regards,    Darleen Sorensen MD   [facility]  Office: [phone]

## 2021-11-11 NOTE — ED ADULT NURSE REASSESSMENT NOTE - NS ED NURSE REASSESS COMMENT FT1
Report received from MARIELLA Diaz. Pt A/O x2 (identifies name, and location). Daughter at bedside acting as . Pt well appearing. Denies pain/ discomfort. No signs of distress. Commode at bedside. Pt made aware of plan care and need for urine sample. Side rials up, blankets provided.

## 2021-11-11 NOTE — ED PROVIDER NOTE - NSICDXPASTMEDICALHX_GEN_ALL_CORE_FT
PAST MEDICAL HISTORY:  Acute Coronary Syndrome     Atrial Fibrillation     Chronic Constipation     H/O gastroesophageal reflux (GERD)     History of Colon Cancer     Hypertension     Renal Calculi     S/P Cardiac Pacemaker Procedure     Tachy-Francis Syndrome

## 2021-11-11 NOTE — H&P ADULT - ASSESSMENT
89 female    uti  cont ceftriax  f/u cult    alo  f/u CT  renal consult   89 female h/o afib not on AC, htn, gerd, here with sepsis, uti, alo    uti  cont ceftriax  f/u cult and sens    alo  check bladder scan  renal consulted  avoid nephrotoxins    afib  ASA  off AC  rate control    htn  cont home meds    gerd  ppi      PT    d/w family at bedside      Advanced care planning was discussed with patient and family.  Advanced care planning forms were reviewed and discussed as appropriate.  Differential diagnosis and plan of care discussed with patient after the evaluation.   Pain assessed and judicious use of narcotics when appropriate was discussed.  Importance of Fall prevention discussed.  Counseling on Smoking and Alcohol cessation was offered when appropriate.  Counseling on Diet, exercise, and medication compliance was done.       Approx 65 minutes spent.

## 2021-11-11 NOTE — ED PROVIDER NOTE - OBJECTIVE STATEMENT
89 y/o Austrian speaking female with PMHx of A. fib, HTN, GERD, presents to the ED sent in by PCP for elevated WBC count. 89 y/o Marshallese speaking female with PMHx of A. fib, HTN, GERD, presents to the ED sent in by PCP for elevated WBC count. Patient was seen in office yesterday to have her labs done. Patient is currently being treated for UTI taking Cipro for past five days. As per daughter patient does not seem to be feeling better. Daughter states that she has been very lethargic and is not getting out of bed the past few days. SHe is complaining that her "legs hurt her". She states that she noticed a foul smell to the urine about 1 week ago which has not improved. Daughter states that she has been eating normally but not drinking enough fluids. Admits to chills but denies fever. No headache, chest pain, cough, difficulty breathing, abdominal pain, n/v/d. No hematuria.    Offered  patient prefers daughter to translate at bedside

## 2021-11-11 NOTE — ED ADULT NURSE NOTE - OBJECTIVE STATEMENT
89y Croatian speaking female with PMHx of A. fib, HTN, GERD, presents to the ED sent in by PCP for elevated WBC count. Offered  patient prefers daughter to translate at bedside as per daughter patient was seen in office yesterday to have her labs done. Patient is currently being treated for UTI taking Cipro for past five days. As per daughter patient does not seem to be feeling better. Daughter states that she has been very lethargic and is not getting out of bed the past few days. pt is complaining that her "legs hurt her". daughter states that she noticed a foul smell to her mothers urine about 1 week ago which has not improved. Daughter states that she has been eating normally but not drinking enough fluids. Admits to chills but denies fever, denies headache, chest pain, cough, SOB, abdominal pain, n/v/d. blood in urine/stool.

## 2021-11-12 NOTE — PROGRESS NOTE ADULT - SUBJECTIVE AND OBJECTIVE BOX
GOLDEN YOUNG  89y Female  MRN:[id number]    Patient is a 89y old  Female who presents with a chief complaint of   HPI:  89 y/o Palestinian speaking female with PMHx of A. fib, HTN, GERD, presents to the ED sent in by PCP for elevated WBC count. Patient was seen in office yesterday to have her labs done. Patient is currently being treated for UTI taking Cipro for past five days. As per daughter patient does not seem to be feeling better. Daughter states that she has been very lethargic and is not getting out of bed the past few days. SHe is complaining that her "legs hurt her". She states that she noticed a foul smell to the urine about 1 week ago which has not improved. Daughter states that she has been eating normally but not drinking enough fluids. Admits to chills but denies fever. No headache, chest pain, cough, difficulty breathing, abdominal pain, n/v/d. No hematuria.    	Offered  patient prefers daughter to translate at bedside (2021 13:59)      Patient seen and evaluated at bedside. No acute events overnight except as noted.    Interval HPI: no acute events o/n    PAST MEDICAL & SURGICAL HISTORY:  Acute Coronary Syndrome    Atrial Fibrillation    Hypertension    Renal Calculi    History of Colon Cancer    S/P Cardiac Pacemaker Procedure    Tachy-Francis Syndrome    Chronic Constipation    H/O gastroesophageal reflux (GERD)    History of Appendectomy    S/P LAVERNE (Total Abdominal Hysterectomy)    History of Colon Resection    Open fracture of right hip  ORIF of Rt Hip ()    Pacemaker        REVIEW OF SYSTEMS:  as per hpi       VITALS:  Vital Signs Last 24 Hrs  T(C): 36.7 (2021 08:32), Max: 36.7 (2021 20:07)  T(F): 98.1 (2021 08:32), Max: 98.1 (2021 20:07)  HR: 76 (2021 08:32) (61 - 82)  BP: 116/51 (2021 08:32) (113/75 - 154/77)  BP(mean): 87 (2021 20:07) (87 - 101)  RR: 18 (2021 08:32) (16 - 18)  SpO2: 96% (2021 08:32) (96% - 100%)  CAPILLARY BLOOD GLUCOSE        I&O's Summary    2021 07:01  -  2021 07:00  --------------------------------------------------------  IN: 600 mL / OUT: 0 mL / NET: 600 mL        PHYSICAL EXAM:  GENERAL: NAD, well-developed  HEAD:  Atraumatic, Normocephalic  EYES: EOMI, PERRLA, conjunctiva and sclera clear  NECK: Supple, No JVD  CHEST/LUNG: Clear to auscultation bilaterally; No wheeze  HEART: S1, S2; No murmurs, rubs, or gallops  ABDOMEN: Soft, Nontender, Nondistended; Bowel sounds present  EXTREMITIES:  2+ Peripheral Pulses, No clubbing, cyanosis, or edema  PSYCH: Normal affect  NEUROLOGY: AAOX3; non-focal  SKIN: No rashes or lesions    Consultant(s) Notes Reviewed:  [x ] YES  [ ] NO  Care Discussed with Consultants/Other Providers [ x] YES  [ ] NO    MEDS:  MEDICATIONS  (STANDING):  amLODIPine   Tablet 5 milliGRAM(s) Oral daily  aspirin enteric coated 81 milliGRAM(s) Oral daily  cefTRIAXone   IVPB 1000 milliGRAM(s) IV Intermittent every 24 hours  metoprolol tartrate 100 milliGRAM(s) Oral two times a day  nystatin Powder 1 Application(s) Topical three times a day  pantoprazole    Tablet 40 milliGRAM(s) Oral before breakfast  simvastatin 20 milliGRAM(s) Oral at bedtime  sodium chloride 0.9%. 1000 milliLiter(s) (50 mL/Hr) IV Continuous <Continuous>    MEDICATIONS  (PRN):  zolpidem 5 milliGRAM(s) Oral at bedtime PRN Insomnia    ALLERGIES:  Demerol HCl (Vomiting)  meperidine (Stomach Upset)      LABS:                        11.0   9.82  )-----------( 393      ( 2021 11:57 )             34.3         133<L>  |  91<L>  |  23  ----------------------------<  92  3.5   |  27  |  2.01<H>    Ca    9.8      2021 12:09  Mg     2.2         TPro  7.2  /  Alb  3.4  /  TBili  0.3  /  DBili  x   /  AST  25  /  ALT  14  /  AlkPhos  65            LIVER FUNCTIONS - ( 2021 12:09 )  Alb: 3.4 g/dL / Pro: 7.2 g/dL / ALK PHOS: 65 U/L / ALT: 14 U/L / AST: 25 U/L / GGT: x           Urinalysis Basic - ( 2021 20:49 )    Color: Light Yellow / Appearance: Slightly Turbid / S.010 / pH: x  Gluc: x / Ketone: Negative  / Bili: Negative / Urobili: Negative   Blood: x / Protein: Trace / Nitrite: Negative   Leuk Esterase: Large / RBC: 3 /hpf /  /HPF   Sq Epi: x / Non Sq Epi: 5 /hpf / Bacteria: Few      TSH:   A1c:  BNP:  Lipid panel:   cultures:     RADIOLOGY & ADDITIONAL TESTS:    Imaging Personally Reviewed:  [ ] YES  [ ] NO

## 2021-11-12 NOTE — PHYSICAL THERAPY INITIAL EVALUATION ADULT - ACTIVE RANGE OF MOTION EXAMINATION, REHAB EVAL
except B/L ankles to neutral, L sh to 90 sh fl/ R sh fl 120/bilateral upper extremity Active ROM was WFL (within functional limits)/bilateral  lower extremity Active ROM was WFL (within functional limits)

## 2021-11-12 NOTE — PHYSICAL THERAPY INITIAL EVALUATION ADULT - ADDITIONAL COMMENTS
lives w/ dtr in private home, 5 steps to enter and 5 steps to get up to living area, pt has rolling walker, shower seat and grab bars, reading glasses, hearing good, pt is R handed lives w/ dtr in private home, 5 steps to enter and 5 steps to get up to living area, pt has rolling walker, shower seat and grab bars,  glasses all the time,  hearing good, pt is R handed/ dtr assists w/ dressing, showering

## 2021-11-12 NOTE — PHYSICAL THERAPY INITIAL EVALUATION ADULT - GENERAL OBSERVATIONS, REHAB EVAL
Rec'd in Rec'd in bed, + IV LUE, NAD L wrist in ace wrap 2/2 to aches and it feel better when wrapped per dtr, agreeable to PT,

## 2021-11-12 NOTE — PHYSICAL THERAPY INITIAL EVALUATION ADULT - PLANNED THERAPY INTERVENTIONS, PT EVAL
Goal Pt will negotiate 5 steps w /one handrail 2 weeks/balance training/bed mobility training/gait training/strengthening/transfer training

## 2021-11-12 NOTE — PHYSICAL THERAPY INITIAL EVALUATION ADULT - CRITERIA FOR SKILLED THERAPEUTIC INTERVENTIONS
home PT for bed mobs, transfers, amb training, balance training and ther exercises/impairments found/anticipated discharge recommendation

## 2021-11-12 NOTE — PROGRESS NOTE ADULT - ASSESSMENT
89 female h/o afib not on AC, htn, gerd, here with sepsis, uti, alo    uti  cont ceftriax iv  f/u cult and sens    alo  check bladder scan  renal consult appred  avoid nephrotoxins  gentle hydration  f/u bmp today    afib  ASA  off AC  rate control    htn  cont home meds    gerd  ppi      PT    d/w family at bedside      Advanced care planning was discussed with patient and family.  Advanced care planning forms were reviewed and discussed as appropriate.  Differential diagnosis and plan of care discussed with patient after the evaluation.   Pain assessed and judicious use of narcotics when appropriate was discussed.  Importance of Fall prevention discussed.  Counseling on Smoking and Alcohol cessation was offered when appropriate.  Counseling on Diet, exercise, and medication compliance was done.       Approx 65 minutes spent.

## 2021-11-12 NOTE — PROGRESS NOTE ADULT - SUBJECTIVE AND OBJECTIVE BOX
More alert today, on     VITAL:  T(C): , Max: 36.7 (21 @ 20:07)  T(F): , Max: 98.1 (21 @ 20:07)  HR: 73 (21 @ 13:25)  BP: 145/69 (21 @ 13:25)  BP(mean): 87 (21 @ 20:07)  RR: 18 (21 @ 13:25)  SpO2: 95% (21 @ 13:25)  Wt(kg): --      PHYSICAL EXAM:  General: Alerted, oriented  HEENT: MMM  Lungs:CTA-b/l  Heart: RRR S1S2  Abdomen: Soft, NTND  Ext: no pedal edema b/l  : no dodd      LABS:                        11.0   9.82  )-----------( 393      ( 2021 11:57 )             34.3     Na(135)/K(3.6)/Cl(97)/HCO3(26)/BUN(19)/Cr(1.76)Glu(92)/Ca(9.5)/Mg(--)/PO4(--)     @ 11:57  Na(133)/K(3.5)/Cl(91)/HCO3(27)/BUN(23)/Cr(2.01)Glu(92)/Ca(9.8)/Mg(2.2)/PO4(--)     @ 12:09    Urinalysis Basic - ( 2021 20:49 )    Color: Light Yellow / Appearance: Slightly Turbid / S.010 / pH: x  Gluc: x / Ketone: Negative  / Bili: Negative / Urobili: Negative   Blood: x / Protein: Trace / Nitrite: Negative   Leuk Esterase: Large / RBC: 3 /hpf /  /HPF   Sq Epi: x / Non Sq Epi: 5 /hpf / Bacteria: Few      Creatinine, Random Urine: 43 mg/dL ( @ 20:50)  Protein/Creatinine Ratio Calculation: 0.4 Ratio ( @ 20:50)  Chloride, Random Urine: 52 mmol/L ( @ 20:50)  Sodium, Random Urine: 63 mmol/L ( @ 20:50)      89 y/o Nicaraguan speaking female with PMHx of A. fib, HTN, GERD, presents to the ED sent in by PCP for elevated WBC count in the setting of UTI?     CKDIII/IV, non proteinuric  vs non oliguric MICHAEL likely pre renal given poor appetite- Cr improving post fluids   Mild hyponatremia- improved   CLinical euvolemia  UTI  on ceftraixone       RECOMMEND:  C/w NS at 50 cc/hour. Cnsider stopping fluids tomorrow   Dose meds for Cr Cl 20 ml/min   avoid contrast studies as able  Defer renal imaging   Avoid contrast studies/NSAIDs as able           Darleen Sorensen MD  [facility]  Office: [phone]         More alert today, on NS at 50 cc    VITAL:  T(C): , Max: 36.7 (21 @ 20:07)  T(F): , Max: 98.1 (21 @ 20:07)  HR: 73 (21 @ 13:25)  BP: 145/69 (21 @ 13:25)  BP(mean): 87 (21 @ 20:07)  RR: 18 (21 @ 13:25)  SpO2: 95% (21 @ 13:25)  Wt(kg): --      PHYSICAL EXAM:  General: Alerted, oriented  HEENT: MMM  Lungs:CTA-b/l  Heart: RRR S1S2  Abdomen: Soft, NTND  Ext: no pedal edema b/l  : no dodd      LABS:                        11.0   9.82  )-----------( 393      ( 2021 11:57 )             34.3     Na(135)/K(3.6)/Cl(97)/HCO3(26)/BUN(19)/Cr(1.76)Glu(92)/Ca(9.5)/Mg(--)/PO4(--)     @ 11:57  Na(133)/K(3.5)/Cl(91)/HCO3(27)/BUN(23)/Cr(2.01)Glu(92)/Ca(9.8)/Mg(2.2)/PO4(--)     @ 12:09    Urinalysis Basic - ( 2021 20:49 )    Color: Light Yellow / Appearance: Slightly Turbid / S.010 / pH: x  Gluc: x / Ketone: Negative  / Bili: Negative / Urobili: Negative   Blood: x / Protein: Trace / Nitrite: Negative   Leuk Esterase: Large / RBC: 3 /hpf /  /HPF   Sq Epi: x / Non Sq Epi: 5 /hpf / Bacteria: Few      Creatinine, Random Urine: 43 mg/dL ( @ 20:50)  Protein/Creatinine Ratio Calculation: 0.4 Ratio ( @ 20:50)  Chloride, Random Urine: 52 mmol/L ( @ 20:50)  Sodium, Random Urine: 63 mmol/L ( @ 20:50)      89 y/o Haitian speaking female with PMHx of A. fib, HTN, GERD, presents to the ED sent in by PCP for elevated WBC count in the setting of UTI?     CKDIII/IV, non proteinuric  vs non oliguric MICHAEL likely pre renal given poor appetite- Cr improving post fluids   Mild hyponatremia- improved   CLinical euvolemia  UTI  on ceftraixone       RECOMMEND:  C/w NS at 50 cc/hour. Cnsider stopping fluids tomorrow   Dose meds for Cr Cl 20 ml/min   avoid contrast studies as able  Defer renal imaging   Avoid contrast studies/NSAIDs as able           Darleen Sorensen MD  [facility]  Office: [phone]

## 2021-11-12 NOTE — PHYSICAL THERAPY INITIAL EVALUATION ADULT - PERTINENT HX OF CURRENT PROBLEM, REHAB EVAL
89 y/o Sammarinese speaking female admitted to [facility] on  with PMHx of A. fib, HTN, GERD, presents to the ED sent in by PCP for elevated WBC count in the setting of UTI? 89 y/o Lao speaking female admitted to [facility] on11/11/21 with PMHx of A. fib, HTN, GERD, presents to the ED sent in by PCP for elevated WBC count in the setting of UTI?

## 2021-11-13 NOTE — PROGRESS NOTE ADULT - SUBJECTIVE AND OBJECTIVE BOX
GOLDEN YOUNG  89y Female  MRN:[id number]    Patient is a 89y old  Female who presents with a chief complaint of   HPI:  89 y/o Tajik speaking female with PMHx of A. fib, HTN, GERD, presents to the ED sent in by PCP for elevated WBC count. Patient was seen in office yesterday to have her labs done. Patient is currently being treated for UTI taking Cipro for past five days. As per daughter patient does not seem to be feeling better. Daughter states that she has been very lethargic and is not getting out of bed the past few days. SHe is complaining that her "legs hurt her". She states that she noticed a foul smell to the urine about 1 week ago which has not improved. Daughter states that she has been eating normally but not drinking enough fluids. Admits to chills but denies fever. No headache, chest pain, cough, difficulty breathing, abdominal pain, n/v/d. No hematuria.    	Offered  patient prefers daughter to translate at bedside (2021 13:59)      Patient seen and evaluated at bedside. No acute events overnight except as noted.    Interval HPI: no acute events o/n    PAST MEDICAL & SURGICAL HISTORY:  Acute Coronary Syndrome    Atrial Fibrillation    Hypertension    Renal Calculi    History of Colon Cancer    S/P Cardiac Pacemaker Procedure    Tachy-Francis Syndrome    Chronic Constipation    H/O gastroesophageal reflux (GERD)    History of Appendectomy    S/P LAVERNE (Total Abdominal Hysterectomy)    History of Colon Resection    Open fracture of right hip  ORIF of Rt Hip ()    Pacemaker        REVIEW OF SYSTEMS:  as per hpi       VITALS:   Vital Signs Last 24 Hrs  T(C): 36.6 (2021 16:20), Max: 37.1 (2021 21:31)  T(F): 97.8 (2021 16:20), Max: 98.8 (2021 21:31)  HR: 99 (2021 16:20) (72 - 99)  BP: 134/76 (2021 16:20) (111/72 - 143/67)  BP(mean): --  RR: 18 (2021 16:20) (18 - 18)  SpO2: 96% (2021 16:20) (94% - 96%)    PHYSICAL EXAM:  GENERAL: NAD, well-developed  HEAD:  Atraumatic, Normocephalic  EYES: EOMI, PERRLA, conjunctiva and sclera clear  NECK: Supple, No JVD  CHEST/LUNG: Clear to auscultation bilaterally; No wheeze  HEART: S1, S2; No murmurs, rubs, or gallops  ABDOMEN: Soft, Nontender, Nondistended; Bowel sounds present  EXTREMITIES:  2+ Peripheral Pulses, No clubbing, cyanosis, or edema  PSYCH: Normal affect  NEUROLOGY: AAOX3; non-focal  SKIN: No rashes or lesions    Consultant(s) Notes Reviewed:  [x ] YES  [ ] NO  Care Discussed with Consultants/Other Providers [ x] YES  [ ] NO    MEDS:   MEDICATIONS  (STANDING):  amLODIPine   Tablet 5 milliGRAM(s) Oral daily  aspirin enteric coated 81 milliGRAM(s) Oral daily  cefTRIAXone   IVPB 1000 milliGRAM(s) IV Intermittent every 24 hours  metoprolol tartrate 100 milliGRAM(s) Oral two times a day  nystatin Powder 1 Application(s) Topical three times a day  pantoprazole    Tablet 40 milliGRAM(s) Oral before breakfast  simvastatin 20 milliGRAM(s) Oral at bedtime  sodium chloride 0.9%. 1000 milliLiter(s) (75 mL/Hr) IV Continuous <Continuous>    MEDICATIONS  (PRN):  acetaminophen     Tablet .. 650 milliGRAM(s) Oral every 6 hours PRN Moderate Pain (4 - 6)  zolpidem 5 milliGRAM(s) Oral at bedtime PRN Insomnia        ALLERGIES:  Demerol HCl (Vomiting)  meperidine (Stomach Upset)      LABS:                                               11.0   7.34  )-----------( 366      ( 2021 07:25 )             34.4       135  |  97  |  18  ----------------------------<  75  3.2<L>   |  24  |  1.82<H>    Ca    8.6      2021 07:23            LIVER FUNCTIONS - ( 2021 12:09 )  Alb: 3.4 g/dL / Pro: 7.2 g/dL / ALK PHOS: 65 U/L / ALT: 14 U/L / AST: 25 U/L / GGT: x           Urinalysis Basic - ( 2021 20:49 )    Color: Light Yellow / Appearance: Slightly Turbid / S.010 / pH: x  Gluc: x / Ketone: Negative  / Bili: Negative / Urobili: Negative   Blood: x / Protein: Trace / Nitrite: Negative   Leuk Esterase: Large / RBC: 3 /hpf /  /HPF   Sq Epi: x / Non Sq Epi: 5 /hpf / Bacteria: Few      TSH:   A1c:  BNP:  Lipid panel:   cultures:     RADIOLOGY & ADDITIONAL TESTS:    Imaging Personally Reviewed:  [ ] YES  [ ] NO

## 2021-11-13 NOTE — PROGRESS NOTE ADULT - ASSESSMENT
89 female h/o afib not on AC, htn, gerd, here with sepsis, uti, alo    uti  cont ceftriax iv  cult noted  f/u with ID regarding abx plan  dr crooks consulted     alo  renal followup  avoid nephrotoxins  gentle hydration       afib  ASA  off AC  rate control    htn  cont home meds    gerd  ppi      PT    d/w family at bedside      Advanced care planning was discussed with patient and family.  Advanced care planning forms were reviewed and discussed as appropriate.  Differential diagnosis and plan of care discussed with patient after the evaluation.   Pain assessed and judicious use of narcotics when appropriate was discussed.  Importance of Fall prevention discussed.  Counseling on Smoking and Alcohol cessation was offered when appropriate.  Counseling on Diet, exercise, and medication compliance was done.       Approx 65 minutes spent.

## 2021-11-14 NOTE — PROGRESS NOTE ADULT - ASSESSMENT
89 female h/o afib not on AC, htn, gerd, here with sepsis, uti, alo    uti  received ceftriax iv  cult noted  can dc abx after today's dose   d/w id    alo  renal followup  avoid nephrotoxins  gentle hydration  improved        afib  ASA  off AC  rate control    htn  cont home meds    gerd  ppi      PT    d/w family at bedside  dc planning     Advanced care planning was discussed with patient and family.  Advanced care planning forms were reviewed and discussed as appropriate.  Differential diagnosis and plan of care discussed with patient after the evaluation.   Pain assessed and judicious use of narcotics when appropriate was discussed.  Importance of Fall prevention discussed.  Counseling on Smoking and Alcohol cessation was offered when appropriate.  Counseling on Diet, exercise, and medication compliance was done.       Approx 65 minutes spent.

## 2021-11-14 NOTE — DISCHARGE NOTE PROVIDER - HOSPITAL COURSE
89 y/o Taiwanese speaking female with PMHx of A. fib, HTN, GERD, presents to the ED sent in by PCP for elevated WBC count. Patient was seen in office yesterday to have her labs done. Patient is currently being treated for UTI taking Cipro for past five days. As per daughter patient does not seem to be feeling better. Daughter states that she has been very lethargic and is not getting out of bed the past few days. SHe is complaining that her "legs hurt her". She states that she noticed a foul smell to the urine about 1 week ago which has not improved. Daughter states that she has been eating normally but not drinking enough fluids. Admits to chills     Admitted UTI treated with ceftriaxone 89 y/o Ugandan speaking female with PMHx of A. fib, HTN, GERD, presents to the ED sent in by PCP for elevated WBC count. Patient was seen in office yesterday to have her labs done. Patient is currently being treated for UTI taking Cipro for past five days. As per daughter patient does not seem to be feeling better. Daughter states that she has been very lethargic and is not getting out of bed the past few days. SHe is complaining that her "legs hurt her". She states that she noticed a foul smell to the urine about 1 week ago which has not improved. Daughter states that she has been eating normally but not drinking enough fluids. Admits to chills     Admitted UTI treated with ceftriaxone   patient stable for discharge as per Dr. Shukla 89 y/o Maori speaking female with PMHx of A. fib not on A/c , HTN, GERD, presents to the ED sent in by PCP for elevated WBC count. Patient was seen in office yesterday to have her labs done. Patient is currently being treated for UTI taking Cipro for past five days. As per daughter patient does not seem to be feeling better. Daughter states that she has been very lethargic and is not getting out of bed the past few days. SHe is complaining that her "legs hurt her". She states that she noticed a foul smell to the urine about 1 week ago which has not improved. Daughter states that she has been eating normally but not drinking enough fluids. Admits to [facility]   Admitted with sepsis /  UTI treated with ceftriaxone found with MICHAEL   patient stable for discharge as per Dr. Shukla 89 y/o Slovak speaking female with PMHx of A. fib not on A/c , HTN, GERD, presents to the ED sent in by PCP for elevated WBC count. Patient was seen in office yesterday to have her labs done. Patient is currently being treated for UTI taking Cipro for past five days. As per daughter patient does not seem to be feeling better. Daughter states that she has been very lethargic and is not getting out of bed the past few days. SHe is complaining that her "legs hurt her". She states that she noticed a foul smell to the urine about 1 week ago which has not improved. Daughter states that she has been eating normally but not drinking enough fluids. Admits to [facility]   Admitted with sepsis /  UTI treated with ceftriaxone found with MICHAEL   patient stable for discharge as per Dr. Shukla         Advanced care planning was discussed with patient and family.  Advanced care planning forms were reviewed and discussed as appropriate.  Differential diagnosis and plan of care discussed with patient after the evaluation.   Pain assessed and judicious use of narcotics when appropriate was discussed.  Importance of Fall prevention discussed.  Counseling on Smoking and Alcohol cessation was offered when appropriate.  Counseling on Diet, exercise, and medication compliance was done.       Approx 65 minutes spent.

## 2021-11-14 NOTE — DISCHARGE NOTE PROVIDER - NSDCMRMEDTOKEN_GEN_ALL_CORE_FT
acetaminophen 325 mg oral tablet: 2 tab(s) orally every 6 hours, As needed, Moderate Pain (4 - 6)  Ambien 5 mg oral tablet: 1 tab(s) orally once a day (at bedtime)  amLODIPine 5 mg oral tablet: 1 tab(s) orally once a day  Ecotrin 81 mg oral delayed release tablet: 1 tab(s) orally once a day  folic acid 1 mg oral tablet: 1 tab(s) orally once a day  Metoprolol Tartrate 100 mg oral tablet: 1 tab(s) orally 2 times a day  Potassium Chloride (Eqv-Klor-Con 10) 10 mEq oral tablet, extended release: 1 tab(s) orally once a day  PriLOSEC 40 mg oral delayed release capsule: 1 cap(s) orally once a day  simvastatin 20 mg oral tablet: 1 tab(s) orally once a day (at bedtime)

## 2021-11-14 NOTE — DISCHARGE NOTE PROVIDER - NSDCCPCAREPLAN_GEN_ALL_CORE_FT
PRINCIPAL DISCHARGE DIAGNOSIS  Diagnosis: Acute UTI  Assessment and Plan of Treatment: status post antibiotic      SECONDARY DISCHARGE DIAGNOSES  Diagnosis: MICHAEL (acute kidney injury)  Assessment and Plan of Treatment: Avoid taking (NSAIDs) - (ex: Ibuprofen, Advil, Celebrex, Naprosyn)  Avoid taking any nephrotoxic agents (can harm kidneys) - Intravenous contrast for diagnostic testing, combination cold medications.  Have all medications adjusted for your renal function by your Health Care Provider.  Blood pressure control is important.  Take all medication as prescribed.      Diagnosis: HTN (hypertension)  Assessment and Plan of Treatment: Low salt diet  Activity as tolerated.  Take all medication as prescribed.  Follow up with your medical doctor for routine blood pressure monitoring at your next visit.  Notify your doctor if you have any of the following symptoms:   Dizziness, Lightheadedness, Blurry vision, Headache, Chest pain, Shortness of breath      Diagnosis: Afib  Assessment and Plan of Treatment:      PRINCIPAL DISCHARGE DIAGNOSIS  Diagnosis: Acute UTI  Assessment and Plan of Treatment: status post antibiotic  Please follow up with PMD      SECONDARY DISCHARGE DIAGNOSES  Diagnosis: MICHAEL (acute kidney injury)  Assessment and Plan of Treatment: Avoid taking (NSAIDs) - (ex: Ibuprofen, Advil, Celebrex, Naprosyn)  Avoid taking any nephrotoxic agents (can harm kidneys) - Intravenous contrast for diagnostic testing, combination cold medications.  Have all medications adjusted for your renal function by your Health Care Provider.  Blood pressure control is important.  Take all medication as prescribed.      Diagnosis: HTN (hypertension)  Assessment and Plan of Treatment: Low salt diet  Activity as tolerated.  Take all medication as prescribed.  Follow up with your medical doctor for routine blood pressure monitoring at your next visit.  Notify your doctor if you have any of the following symptoms:   Dizziness, Lightheadedness, Blurry vision, Headache, Chest pain, Shortness of breath      Diagnosis: Afib  Assessment and Plan of Treatment: on Aspirin   monitor signs of bleeding

## 2021-11-14 NOTE — PROGRESS NOTE ADULT - ASSESSMENT
ASSESSMENT/PLAN:   89 y/o Arabic speaking female with PMHx of A. fib, HTN, GERD, presents to the ED sent in by PCP for elevated WBC count in the setting of UTI?     CKDIII/IV, non proteinuric  vs non oliguric MICHAEL likely pre renal given poor appetite- SCr continues to improve s/p IVF   Mild hyponatremia   Clinicaleuvolemia  UTI on Ceftriaxone    RECOMMEND:    Dose meds for Cr Cl 20 ml/min   Avoid contrast studies as able  Defer renal imaging   Avoid contrast studies/NSAIDs as able     D/w Dr. Franchesca Connors, NP-C  [facility]  [phone]         ASSESSMENT/PLAN:   89 y/o Amharic speaking female with PMHx of A. fib, HTN, GERD, presents to the ED sent in by PCP for elevated WBC count in the setting of UTI?     CKDIII/IV, non proteinuric  vs non oliguric MICHAEL likely pre renal given poor appetite- SCr continues to improve s/p IVF   Mild hyponatremia   Hypokalemia   Clinicaleuvolemia  UTI on Ceftriaxone    RECOMMEND:  Could discontinue the IVF  KCl 40 mEq po x 1  Dose meds for Cr Cl 20-30 ml/min   Avoid contrast studies as able  Defer renal imaging   Avoid contrast studies/NSAIDs as able   No renal objection for discharge    D/w Dr. Franchesca Connors, NP-C  [facility]  [phone]

## 2021-11-14 NOTE — CHART NOTE - NSCHARTNOTEFT_GEN_A_CORE
Patient is a 89y old  Female who presents with a chief complaint of     Vital Signs Last 24 Hrs  T(C): 36.7 (14 Nov 2021 16:49), Max: 36.8 (13 Nov 2021 20:52)  T(F): 98.1 (14 Nov 2021 16:49), Max: 98.2 (13 Nov 2021 20:52)  HR: 73 (14 Nov 2021 16:49) (54 - 83)  BP: 155/74 (14 Nov 2021 16:49) (113/68 - 156/70)  BP(mean): --  RR: 18 (14 Nov 2021 16:49) (18 - 18)  SpO2: 97% (14 Nov 2021 16:49) (92% - 97%)      Labs:                          11.0   7.34  )-----------( 366      ( 13 Nov 2021 07:25 )             34.4     11-14    134<L>  |  97  |  15  ----------------------------<  82  3.1<L>   |  24  |  1.60<H>    Ca    9.5      14 Nov 2021 06:55              Radiology:    Physical Exam:  General: WN/WD NAD  Neurology: A&Ox3, nonfocal, RUIZ x 4  Head:  Normocephalic, atraumatic  Respiratory: CTA B/L  CV: RRR, S1S2, no murmur  Abdominal: Soft, NT, ND no palpable mass  MSK: No edema, + peripheral pulses, FROM all 4 extremity    Discharge Note and Plan: Discussed with Dr. Shukla patient medically stable for discharge     >Follow up with   >  >  >

## 2021-11-14 NOTE — PROGRESS NOTE ADULT - SUBJECTIVE AND OBJECTIVE BOX
NEPHROLOGY    Patient seen and examined.    MEDICATIONS  (STANDING):  amLODIPine   Tablet 5 milliGRAM(s) Oral daily  aspirin enteric coated 81 milliGRAM(s) Oral daily  cefTRIAXone   IVPB 1000 milliGRAM(s) IV Intermittent every 24 hours  metoprolol tartrate 100 milliGRAM(s) Oral two times a day  nystatin Powder 1 Application(s) Topical three times a day  pantoprazole    Tablet 40 milliGRAM(s) Oral before breakfast  simvastatin 20 milliGRAM(s) Oral at bedtime  sodium chloride 0.9%. 1000 milliLiter(s) (75 mL/Hr) IV Continuous <Continuous>    VITALS:  T(C): , Max: 36.8 (11-13-21 @ 20:52)  T(F): , Max: 98.2 (11-13-21 @ 20:52)  HR: 83 (11-14-21 @ 10:02)  BP: 145/79 (11-14-21 @ 10:02)  RR: 18 (11-14-21 @ 10:02)  SpO2: 96% (11-14-21 @ 10:02)    I and O's:    11-13 @ 07:01  -  11-14 @ 07:00  --------------------------------------------------------  IN: 1375 mL / OUT: 1850 mL / NET: -475 mL    11-14 @ 07:01  -  11-14 @ 12:38  --------------------------------------------------------  IN: 240 mL / OUT: 250 mL / NET: -10 mL    PHYSICAL EXAM:      LABS:                        11.0   7.34  )-----------( 366      ( 13 Nov 2021 07:25 )             34.4     11-14    134<L>  |  97  |  15  ----------------------------<  82  3.1<L>   |  24  |  1.60<H>    Ca    9.5      14 Nov 2021 06:55     NEPHROLOGY    Patient seen and examined with daughter at bedside, who reports pt is doing better, denies any pain, no sob, in no acute distress.     MEDICATIONS  (STANDING):  amLODIPine   Tablet 5 milliGRAM(s) Oral daily  aspirin enteric coated 81 milliGRAM(s) Oral daily  cefTRIAXone   IVPB 1000 milliGRAM(s) IV Intermittent every 24 hours  metoprolol tartrate 100 milliGRAM(s) Oral two times a day  nystatin Powder 1 Application(s) Topical three times a day  pantoprazole    Tablet 40 milliGRAM(s) Oral before breakfast  simvastatin 20 milliGRAM(s) Oral at bedtime  sodium chloride 0.9%. 1000 milliLiter(s) (75 mL/Hr) IV Continuous <Continuous>    VITALS:  T(C): , Max: 36.8 (11-13-21 @ 20:52)  T(F): , Max: 98.2 (11-13-21 @ 20:52)  HR: 83 (11-14-21 @ 10:02)  BP: 145/79 (11-14-21 @ 10:02)  RR: 18 (11-14-21 @ 10:02)  SpO2: 96% (11-14-21 @ 10:02)    I and O's:    11-13 @ 07:01  -  11-14 @ 07:00  --------------------------------------------------------  IN: 1375 mL / OUT: 1850 mL / NET: -475 mL    11-14 @ 07:01  -  11-14 @ 12:38  --------------------------------------------------------  IN: 240 mL / OUT: 250 mL / NET: -10 mL    PHYSICAL EXAM:  General: Alerted, oriented  HEENT: MMM  Lungs:CTA-b/l  Heart: RRR S1S2  Abdomen: Soft, NTND  Ext: no pedal edema b/l  : no dodd    LABS:                        11.0   7.34  )-----------( 366      ( 13 Nov 2021 07:25 )             34.4     11-14    134<L>  |  97  |  15  ----------------------------<  82  3.1<L>   |  24  |  1.60<H>    Ca    9.5      14 Nov 2021 06:55     NEPHROLOGY    Patient seen and examined with daughter at bedside. She reports pt is doing better, denies any pain, no sob, in no acute distress. No overnight events noted.     MEDICATIONS  (STANDING):  amLODIPine   Tablet 5 milliGRAM(s) Oral daily  aspirin enteric coated 81 milliGRAM(s) Oral daily  cefTRIAXone   IVPB 1000 milliGRAM(s) IV Intermittent every 24 hours  metoprolol tartrate 100 milliGRAM(s) Oral two times a day  nystatin Powder 1 Application(s) Topical three times a day  pantoprazole    Tablet 40 milliGRAM(s) Oral before breakfast  simvastatin 20 milliGRAM(s) Oral at bedtime  sodium chloride 0.9%. 1000 milliLiter(s) (75 mL/Hr) IV Continuous <Continuous>    VITALS:  T(C): , Max: 36.8 (11-13-21 @ 20:52)  T(F): , Max: 98.2 (11-13-21 @ 20:52)  HR: 83 (11-14-21 @ 10:02)  BP: 145/79 (11-14-21 @ 10:02)  RR: 18 (11-14-21 @ 10:02)  SpO2: 96% (11-14-21 @ 10:02)    I and O's:    11-13 @ 07:01  -  11-14 @ 07:00  --------------------------------------------------------  IN: 1375 mL / OUT: 1850 mL / NET: -475 mL    11-14 @ 07:01  -  11-14 @ 12:38  --------------------------------------------------------  IN: 240 mL / OUT: 250 mL / NET: -10 mL    PHYSICAL EXAM:  General: Alerted, oriented  HEENT: MMM  Lungs:CTA-b/l  Heart: RRR S1S2  Abdomen: Soft, NTND  Ext: no pedal edema b/l  : no dodd    LABS:                        11.0   7.34  )-----------( 366      ( 13 Nov 2021 07:25 )             34.4     11-14    134<L>  |  97  |  15  ----------------------------<  82  3.1<L>   |  24  |  1.60<H>    Ca    9.5      14 Nov 2021 06:55

## 2021-11-14 NOTE — PROGRESS NOTE ADULT - SUBJECTIVE AND OBJECTIVE BOX
GOLDEN YOUNG  89y Female  MRN:[id number]    Patient is a 89y old  Female who presents with a chief complaint of   HPI:  89 y/o Barbadian speaking female with PMHx of A. fib, HTN, GERD, presents to the ED sent in by PCP for elevated WBC count. Patient was seen in office yesterday to have her labs done. Patient is currently being treated for UTI taking Cipro for past five days. As per daughter patient does not seem to be feeling better. Daughter states that she has been very lethargic and is not getting out of bed the past few days. SHe is complaining that her "legs hurt her". She states that she noticed a foul smell to the urine about 1 week ago which has not improved. Daughter states that she has been eating normally but not drinking enough fluids. Admits to chills but denies fever. No headache, chest pain, cough, difficulty breathing, abdominal pain, n/v/d. No hematuria.    	Offered  patient prefers daughter to translate at bedside (2021 13:59)      Patient seen and evaluated at bedside. No acute events overnight except as noted.    Interval HPI: no acute events o/n    PAST MEDICAL & SURGICAL HISTORY:  Acute Coronary Syndrome    Atrial Fibrillation    Hypertension    Renal Calculi    History of Colon Cancer    S/P Cardiac Pacemaker Procedure    Tachy-Francis Syndrome    Chronic Constipation    H/O gastroesophageal reflux (GERD)    History of Appendectomy    S/P LAVERNE (Total Abdominal Hysterectomy)    History of Colon Resection    Open fracture of right hip  ORIF of Rt Hip ()    Pacemaker        REVIEW OF SYSTEMS:  as per hpi       VITALS:   Vital Signs Last 24 Hrs  T(C): 36.8 (2021 13:59), Max: 36.8 (2021 20:52)  T(F): 98.2 (2021 13:59), Max: 98.2 (2021 20:52)  HR: 70 (2021 13:59) (54 - 99)  BP: 129/69 (2021 13:59) (113/68 - 156/70)  BP(mean): --  RR: 18 (2021 13:59) (18 - 18)  SpO2: 95% (2021 13:59) (92% - 96%)    PHYSICAL EXAM:  GENERAL: NAD, well-developed  HEAD:  Atraumatic, Normocephalic  EYES: EOMI, PERRLA, conjunctiva and sclera clear  NECK: Supple, No JVD  CHEST/LUNG: Clear to auscultation bilaterally; No wheeze  HEART: S1, S2; No murmurs, rubs, or gallops  ABDOMEN: Soft, Nontender, Nondistended; Bowel sounds present  EXTREMITIES:  2+ Peripheral Pulses, No clubbing, cyanosis, or edema  PSYCH: Normal affect  NEUROLOGY: AAOX3; non-focal  SKIN: No rashes or lesions    Consultant(s) Notes Reviewed:  [x ] YES  [ ] NO  Care Discussed with Consultants/Other Providers [ x] YES  [ ] NO    MEDS:   MEDICATIONS  (STANDING):  amLODIPine   Tablet 5 milliGRAM(s) Oral daily  aspirin enteric coated 81 milliGRAM(s) Oral daily  cefTRIAXone   IVPB 1000 milliGRAM(s) IV Intermittent every 24 hours  metoprolol tartrate 100 milliGRAM(s) Oral two times a day  nystatin Powder 1 Application(s) Topical three times a day  pantoprazole    Tablet 40 milliGRAM(s) Oral before breakfast  simvastatin 20 milliGRAM(s) Oral at bedtime  sodium chloride 0.9%. 1000 milliLiter(s) (75 mL/Hr) IV Continuous <Continuous>    MEDICATIONS  (PRN):  acetaminophen     Tablet .. 650 milliGRAM(s) Oral every 6 hours PRN Moderate Pain (4 - 6)  zolpidem 5 milliGRAM(s) Oral at bedtime PRN Insomnia      ALLERGIES:  Demerol HCl (Vomiting)  meperidine (Stomach Upset)      LABS:                                          11.0   7.34  )-----------( 366      ( 2021 07:25 )             34.4   11-14    134<L>  |  97  |  15  ----------------------------<  82  3.1<L>   |  24  |  1.60<H>    Ca    9.5      2021 06:55          LIVER FUNCTIONS - ( 2021 12:09 )  Alb: 3.4 g/dL / Pro: 7.2 g/dL / ALK PHOS: 65 U/L / ALT: 14 U/L / AST: 25 U/L / GGT: x           Urinalysis Basic - ( 2021 20:49 )    Color: Light Yellow / Appearance: Slightly Turbid / S.010 / pH: x  Gluc: x / Ketone: Negative  / Bili: Negative / Urobili: Negative   Blood: x / Protein: Trace / Nitrite: Negative   Leuk Esterase: Large / RBC: 3 /hpf /  /HPF   Sq Epi: x / Non Sq Epi: 5 /hpf / Bacteria: Few     < from: CT Abdomen and Pelvis No Cont (. @ 13:52) >  IMPRESSION:  No evidence of acute abnormality in the abdomen and pelvis.  Bowel and fat-containing ventral hernias. No bowel obstruction.      < end of copied text >

## 2021-11-14 NOTE — DISCHARGE NOTE NURSING/CASE MANAGEMENT/SOCIAL WORK - PATIENT PORTAL LINK FT
You can access the FollowMyHealth Patient Portal offered by [facility] by registering at the following website: [email or web address]. By joining [facility]’s FollowMyHealth portal, you will also be able to view your health information using other applications (apps) compatible with our system.

## 2021-12-31 NOTE — ED ADULT NURSE REASSESSMENT NOTE - NS ED NURSE REASSESS COMMENT FT1
Straight cath for U/A, C/S using sterile equipment and aseptic technique. 400cc dark tru urine output.

## 2021-12-31 NOTE — H&P ADULT - ASSESSMENT
A/P     Adult FTT : decrease oral intake / worsening dementia :  -supportive care   IV fluids     covid -19 pos :  -symptomatic   -no hypoxia   will hold off on RDV/ dexa   will start lovenox     Hx of CAD / HTN / ch afib   not on any AC : likely 2/2 dementia and fall risk   -will c/w home BP meds   rate is controlled     adv alz dementia :  -supportive care   will need placement as no one at home to take care of her     dispo: advance care planning : need to d/w family regarding advance directive . prognosis remain gaurded and she can't make decision for herself .   remain full code for now . will d/w family in morning

## 2021-12-31 NOTE — CHART NOTE - NSCHARTNOTEFT_GEN_A_CORE
COVERING :  Social Work consulted to patient for safe discharge planning. LCSW reviewed patient’s chart. Patient is a 89 y.o. Zimbabwean speaking, , female who presents to [facility] ED due to FFT. Patient A&Ox2. Patient’s grandson Irvin (P:[phone]) present at the bedside and reports to medical team that there is no one available to care for patient at the home, at this time. Patient’s daughter (Eloisa Reece; MRN: [id number]) currently admitted to [address] due to LBO is reported to be patient’s primary caretaker at home. However, patient’s daughter remains acute and pending colonoscopy.    LCSW met with patient and grandson Irvin at the bedside to introduce self, discuss role, and explore safe discharge planning. Patient confused at this time, and unable to participate in interaction. LCSW able to confirm patient’s demographics with grandson.     Irvin confirmed that patient has not been eating or drinking sufficiently in the last few weeks, and only worsened after patient’s daughter Eloisa was hospitalized as she has been primary caregiver for many years now. Irvin shared that they have had to emergently hire an RN yesterday to care for patient however they no longer can care for patient. LCSW explored this with Irvin and inquired about LARA/SNF placement. As per Irvin, he believes that patient’s daughter/his mother Eloisa would not approve as she is reluctant to allow for her to be placed. LCSW emphasized safe discharge planning and necessity for appropriate services for discharge planning.     LCSW met with patient daughter Eloisa at the bedside on [address] to introduce self, discuss role, and inquire about discharge planning.  Patient’s daughter verbalized understanding and consented to interaction. LCSW explored discharge planning for patient with daughter and emphasized the need for appropriate/sufficient care in the home. Daughter confirms that there is no one to care for patient in the home. LCSW explored option for LARA/SNF referral as patient is surrogate decision maker at this time. Daughter became tearful stating that she feels guilty not being able to care for patient but understands that she now needs medical attention herself. LCSW provided active listening and emotional support as needed.    Daughter in agreement with short term LRAA/SNF and provided consent to initiate referral. LCSW provided list to daughter and grandson for review. LCSW requested expedited PT consult to begin referral process. LCSW spoke with medical team later in the day and confirmed that patient likely to be admitted due to UTI and fevers. LCSW also spoke with PT who shared that they will be seeing patient tomorrow. SW/CM to continue to follow for discharge planning.

## 2021-12-31 NOTE — ED PROVIDER NOTE - CLINICAL SUMMARY MEDICAL DECISION MAKING FREE TEXT BOX
89 y/o Panamanian speaking female with PMHx of A. fib, HTN, GERD, BIB by grandson for FTT at home. Pt has no complaints and otherwise dementia is at baseline with no new mental status changes. Will get labs, img, ua on pt. Consult social work

## 2021-12-31 NOTE — ED PROVIDER NOTE - NSICDXPASTMEDICALHX_GEN_ALL_CORE_FT
80 PAST MEDICAL HISTORY:  Acute Coronary Syndrome     Atrial Fibrillation     Chronic Constipation     H/O gastroesophageal reflux (GERD)     History of Colon Cancer     Hypertension     Renal Calculi     S/P Cardiac Pacemaker Procedure     Tachy-Francis Syndrome

## 2021-12-31 NOTE — H&P ADULT - NSHPPHYSICALEXAM_GEN_ALL_CORE
Vital Signs Last 24 Hrs  T(C): 36.9 (31 Dec 2021 17:24), Max: 38 (31 Dec 2021 10:45)  T(F): 98.5 (31 Dec 2021 17:24), Max: 100.4 (31 Dec 2021 10:45)  HR: 100 (31 Dec 2021 17:24) (89 - 100)  BP: 121/68 (31 Dec 2021 17:24) (117/81 - 137/71)  BP(mean): --  RR: 16 (31 Dec 2021 17:24) (16 - 18)  SpO2: 98% (31 Dec 2021 17:24) (98% - 99%) pt. seen and examined, baseline dementia     Vital Signs Last 24 Hrs  T(C): 36.9 (31 Dec 2021 17:24), Max: 38 (31 Dec 2021 10:45)  T(F): 98.5 (31 Dec 2021 17:24), Max: 100.4 (31 Dec 2021 10:45)  HR: 100 (31 Dec 2021 17:24) (89 - 100)  BP: 121/68 (31 Dec 2021 17:24) (117/81 - 137/71)  BP(mean): --  RR: 16 (31 Dec 2021 17:24) (16 - 18)  SpO2: 98% (31 Dec 2021 17:24) (98% - 99%)    gheent : mucosa dry   neck : supple , no jvd   laungs :B/l fair A/E , no w/r/r  heart: s1s2 nml  abd : soft, NABS  ext : no e/c/c, pulses 1+  neuro: aaox1 , lethargic , dementia

## 2021-12-31 NOTE — H&P ADULT - NSHPLABSRESULTS_GEN_ALL_CORE
11.9   15.54 )-----------( 341      ( 31 Dec 2021 11:30 )             37.2   12-31    132<L>  |  100  |  17  ----------------------------<  115<H>  3.9   |  20<L>  |  0.97    Ca    9.0      31 Dec 2021 17:20    TPro  7.1  /  Alb  2.4<L>  /  TBili  0.9  /  DBili  x   /  AST  46<H>  /  ALT  9<L>  /  AlkPhos  61  12-31

## 2021-12-31 NOTE — ED PROVIDER NOTE - ATTENDING CONTRIBUTION TO CARE
Darien Quick MD, FACEP: In this physician's medical judgement based on clinical history and physical exam: Patient reporting to the emergency department as her daughter who helps to care for her is currently admitted for surgery and she has no caretaker at home.   Patient's daughter in the hospital fears she may have to AMA due to the situation and the patient was brought to the emergency department to assist and alleviates concerns of the patient's, her mother's, safety.  will get iv, cbc, cmp, cxr, COVID-19 screen, ua, urine culture  Will follow up on labs, imaging prn, reassess and disposition as clinically indicated.  *The above represents an initial assessment/impression. Please refer to my progress notes below for potential changes in patient clinical course*

## 2021-12-31 NOTE — ED PROVIDER NOTE - PHYSICAL EXAMINATION
Const: Cahcetic, uncooperative with exam.   Eyes: no conjunctival injection, and symmetrical lids.  HEENT: Head NCAT, no lesions. Atraumatic external nose and ears. Moist MM.  Neck: Symmetric, trachea midline.   CVS: RRR  RESP: Unlabored respiratory effort. Pt will not take deep breaths  GI: Nontender/Nondistended,  MSK: Normocephalic/Atraumatic, Lower Extremities w/o calf tenderness or edema b/l.   Skin: Warm, dry and intact.   Psych: Awake, Alert, & Oriented (AAO) x2

## 2021-12-31 NOTE — ED ADULT NURSE NOTE - OBJECTIVE STATEMENT
Pt is a 89 yo F, Irish speaking, who was brought to the Ed from home via EMS c/o not eating/drinking x 2 days. Pt lives with her daughter who cares for patient. Daughter is currently hospitalized and pt's grandson believes pt is acting this way because her daughter is not there caring for her. Pt is a 89 yo F, Gabonese speaking, who was brought to the ED from home via EMS c/o not eating/drinking x 2 days. Pt lives with her daughter who cares for patient. Daughter is currently hospitalized and pt's grandson believes pt is acting this way because her daughter is not there caring for her. Pt alert, intermittently answers basic questions in Gabonese such as what is her name and birthday; other times no repsonse to questions and pt stares blankly into space.

## 2021-12-31 NOTE — ED ADULT NURSE REASSESSMENT NOTE - NS ED NURSE REASSESS COMMENT FT1
Patient A&Ox0, uncooperative, doesn't follow commands. Patient primarily Ugandan speaking. Patient aware they are being admitted to the hospital. Will be notified when bed is available. Patient/family has no further questions at this time.

## 2021-12-31 NOTE — ED PROVIDER NOTE - PROGRESS NOTE DETAILS
Tyrel Heller, PGY1 Pt grandson states he cannot care for her.  social work is consulted and is speaking with pt grandson. ED Sign Out, pending SW / PT / COVID testing for tx / dispo decisions -- Deion Lopez MD Tyrel Heller, PGY1 Previously consulted PT.   Called PT over phone, who states they will try to see pt today Tyrel Heller, PGY1   Pt repeat cmp came

## 2021-12-31 NOTE — H&P ADULT - HISTORY OF PRESENT ILLNESS
89 y/o Burmese speaking female with PMHx of A. fib, HTN, GERD, BIB by grandson for FTT at home. PT daughter/caretaker was admitted to hospital a few days ago and since admission, pt has refused to eat and drink at home. Last meal was this morning and she ate some oatmeal. Pt is otherwise unable to care for herself. Grandson brought pt in and states he cannot care for her at home either. 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 Pt refuses to answer most questions when she is spoken to. Has been bed bound the past week.    pt. sole caretaker is daughter who her self is in hospital for surgery on 7 Tow . and no one else able to take care of her at home , seen by  in ED and after discussion with family , plan is for STR when she is stable

## 2021-12-31 NOTE — H&P ADULT - ENMT
Pharmacy calling for medication clarification. Transferred to nurse.   
Writer spoke with Briseida from the pharmacy. She stated that Nitrofurantoin is usually as QID and Macrobid is BID. Writer clarified with Dr. Williamson that we can do Macrobid BID x 3 days. Carified with Briseida at the pharmacy. Briseida stated a new script was not needed. Med list updated.   
negative

## 2021-12-31 NOTE — ED PROVIDER NOTE - OBJECTIVE STATEMENT
89 y/o Peruvian speaking female with PMHx of A. fib, HTN, GERD, BIB by grandson for FTT at home. PT daughter/caretaker was admitted to hospital a few days ago and since admission, pt has refused to eat and drink at home. Last meal was this morning and she ate some oatmeal. Pt is otherwise unable to care for herself. Grandson brought pt in and states he cannot care for her at home either. 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 Pt refuses to answer most questions when she is spoken to. Has been bed bound the past week.
normal...

## 2022-01-01 ENCOUNTER — TRANSCRIPTION ENCOUNTER (OUTPATIENT)
Age: 87
End: 2022-01-01

## 2022-01-01 ENCOUNTER — APPOINTMENT (OUTPATIENT)
Dept: ELECTROPHYSIOLOGY | Facility: CLINIC | Age: 87
End: 2022-01-01

## 2022-01-01 VITALS
HEART RATE: 74 BPM | DIASTOLIC BLOOD PRESSURE: 77 MMHG | TEMPERATURE: 98 F | OXYGEN SATURATION: 98 % | RESPIRATION RATE: 18 BRPM | SYSTOLIC BLOOD PRESSURE: 126 MMHG

## 2022-01-01 LAB
ANION GAP SERPL CALC-SCNC: 13 MMOL/L — SIGNIFICANT CHANGE UP (ref 5–17)
ANION GAP SERPL CALC-SCNC: 13 MMOL/L — SIGNIFICANT CHANGE UP (ref 5–17)
ANION GAP SERPL CALC-SCNC: 14 MMOL/L — SIGNIFICANT CHANGE UP (ref 5–17)
ANION GAP SERPL CALC-SCNC: 14 MMOL/L — SIGNIFICANT CHANGE UP (ref 5–17)
ANION GAP SERPL CALC-SCNC: 15 MMOL/L — SIGNIFICANT CHANGE UP (ref 5–17)
ANION GAP SERPL CALC-SCNC: 15 MMOL/L — SIGNIFICANT CHANGE UP (ref 5–17)
BASOPHILS # BLD AUTO: 0.04 K/UL — SIGNIFICANT CHANGE UP (ref 0–0.2)
BASOPHILS NFR BLD AUTO: 0.4 % — SIGNIFICANT CHANGE UP (ref 0–2)
BUN SERPL-MCNC: 17 MG/DL — SIGNIFICANT CHANGE UP (ref 7–23)
BUN SERPL-MCNC: 18 MG/DL — SIGNIFICANT CHANGE UP (ref 7–23)
BUN SERPL-MCNC: 20 MG/DL — SIGNIFICANT CHANGE UP (ref 7–23)
BUN SERPL-MCNC: 24 MG/DL — HIGH (ref 7–23)
CALCIUM SERPL-MCNC: 8.7 MG/DL — SIGNIFICANT CHANGE UP (ref 8.4–10.5)
CALCIUM SERPL-MCNC: 8.8 MG/DL — SIGNIFICANT CHANGE UP (ref 8.4–10.5)
CALCIUM SERPL-MCNC: 8.8 MG/DL — SIGNIFICANT CHANGE UP (ref 8.4–10.5)
CALCIUM SERPL-MCNC: 8.9 MG/DL — SIGNIFICANT CHANGE UP (ref 8.4–10.5)
CALCIUM SERPL-MCNC: 9.1 MG/DL — SIGNIFICANT CHANGE UP (ref 8.4–10.5)
CALCIUM SERPL-MCNC: 9.3 MG/DL — SIGNIFICANT CHANGE UP (ref 8.4–10.5)
CHLORIDE SERPL-SCNC: 101 MMOL/L — SIGNIFICANT CHANGE UP (ref 96–108)
CHLORIDE SERPL-SCNC: 102 MMOL/L — SIGNIFICANT CHANGE UP (ref 96–108)
CHLORIDE SERPL-SCNC: 103 MMOL/L — SIGNIFICANT CHANGE UP (ref 96–108)
CHLORIDE SERPL-SCNC: 105 MMOL/L — SIGNIFICANT CHANGE UP (ref 96–108)
CO2 SERPL-SCNC: 20 MMOL/L — LOW (ref 22–31)
CO2 SERPL-SCNC: 22 MMOL/L — SIGNIFICANT CHANGE UP (ref 22–31)
CO2 SERPL-SCNC: 23 MMOL/L — SIGNIFICANT CHANGE UP (ref 22–31)
CO2 SERPL-SCNC: 24 MMOL/L — SIGNIFICANT CHANGE UP (ref 22–31)
CREAT SERPL-MCNC: 0.77 MG/DL — SIGNIFICANT CHANGE UP (ref 0.5–1.3)
CREAT SERPL-MCNC: 0.8 MG/DL — SIGNIFICANT CHANGE UP (ref 0.5–1.3)
CREAT SERPL-MCNC: 0.85 MG/DL — SIGNIFICANT CHANGE UP (ref 0.5–1.3)
CREAT SERPL-MCNC: 0.9 MG/DL — SIGNIFICANT CHANGE UP (ref 0.5–1.3)
CREAT SERPL-MCNC: 0.91 MG/DL — SIGNIFICANT CHANGE UP (ref 0.5–1.3)
CREAT SERPL-MCNC: 0.98 MG/DL — SIGNIFICANT CHANGE UP (ref 0.5–1.3)
CULTURE RESULTS: SIGNIFICANT CHANGE UP
EOSINOPHIL # BLD AUTO: 0.07 K/UL — SIGNIFICANT CHANGE UP (ref 0–0.5)
EOSINOPHIL NFR BLD AUTO: 0.6 % — SIGNIFICANT CHANGE UP (ref 0–6)
GLUCOSE SERPL-MCNC: 103 MG/DL — HIGH (ref 70–99)
GLUCOSE SERPL-MCNC: 105 MG/DL — HIGH (ref 70–99)
GLUCOSE SERPL-MCNC: 111 MG/DL — HIGH (ref 70–99)
GLUCOSE SERPL-MCNC: 86 MG/DL — SIGNIFICANT CHANGE UP (ref 70–99)
GLUCOSE SERPL-MCNC: 88 MG/DL — SIGNIFICANT CHANGE UP (ref 70–99)
GLUCOSE SERPL-MCNC: 98 MG/DL — SIGNIFICANT CHANGE UP (ref 70–99)
HCT VFR BLD CALC: 29.9 % — LOW (ref 34.5–45)
HCT VFR BLD CALC: 30 % — LOW (ref 34.5–45)
HCT VFR BLD CALC: 30.7 % — LOW (ref 34.5–45)
HCT VFR BLD CALC: 31.3 % — LOW (ref 34.5–45)
HCT VFR BLD CALC: 32.2 % — LOW (ref 34.5–45)
HCT VFR BLD CALC: 32.8 % — LOW (ref 34.5–45)
HGB BLD-MCNC: 10 G/DL — LOW (ref 11.5–15.5)
HGB BLD-MCNC: 10.5 G/DL — LOW (ref 11.5–15.5)
HGB BLD-MCNC: 9.4 G/DL — LOW (ref 11.5–15.5)
HGB BLD-MCNC: 9.5 G/DL — LOW (ref 11.5–15.5)
HGB BLD-MCNC: 9.7 G/DL — LOW (ref 11.5–15.5)
HGB BLD-MCNC: 9.8 G/DL — LOW (ref 11.5–15.5)
IMM GRANULOCYTES NFR BLD AUTO: 1.7 % — HIGH (ref 0–1.5)
LYMPHOCYTES # BLD AUTO: 1.46 K/UL — SIGNIFICANT CHANGE UP (ref 1–3.3)
LYMPHOCYTES # BLD AUTO: 13.5 % — SIGNIFICANT CHANGE UP (ref 13–44)
MAGNESIUM SERPL-MCNC: 1.9 MG/DL — SIGNIFICANT CHANGE UP (ref 1.6–2.6)
MAGNESIUM SERPL-MCNC: 2 MG/DL — SIGNIFICANT CHANGE UP (ref 1.6–2.6)
MAGNESIUM SERPL-MCNC: 2.2 MG/DL — SIGNIFICANT CHANGE UP (ref 1.6–2.6)
MCHC RBC-ENTMCNC: 28.2 PG — SIGNIFICANT CHANGE UP (ref 27–34)
MCHC RBC-ENTMCNC: 28.8 PG — SIGNIFICANT CHANGE UP (ref 27–34)
MCHC RBC-ENTMCNC: 28.9 PG — SIGNIFICANT CHANGE UP (ref 27–34)
MCHC RBC-ENTMCNC: 29 PG — SIGNIFICANT CHANGE UP (ref 27–34)
MCHC RBC-ENTMCNC: 29.1 PG — SIGNIFICANT CHANGE UP (ref 27–34)
MCHC RBC-ENTMCNC: 29.1 PG — SIGNIFICANT CHANGE UP (ref 27–34)
MCHC RBC-ENTMCNC: 30.6 GM/DL — LOW (ref 32–36)
MCHC RBC-ENTMCNC: 31.1 GM/DL — LOW (ref 32–36)
MCHC RBC-ENTMCNC: 31.3 GM/DL — LOW (ref 32–36)
MCHC RBC-ENTMCNC: 31.8 GM/DL — LOW (ref 32–36)
MCHC RBC-ENTMCNC: 32 GM/DL — SIGNIFICANT CHANGE UP (ref 32–36)
MCHC RBC-ENTMCNC: 32.3 GM/DL — SIGNIFICANT CHANGE UP (ref 32–36)
MCV RBC AUTO: 90.1 FL — SIGNIFICANT CHANGE UP (ref 80–100)
MCV RBC AUTO: 90.1 FL — SIGNIFICANT CHANGE UP (ref 80–100)
MCV RBC AUTO: 91 FL — SIGNIFICANT CHANGE UP (ref 80–100)
MCV RBC AUTO: 91.2 FL — SIGNIFICANT CHANGE UP (ref 80–100)
MCV RBC AUTO: 92.9 FL — SIGNIFICANT CHANGE UP (ref 80–100)
MCV RBC AUTO: 94.5 FL — SIGNIFICANT CHANGE UP (ref 80–100)
MONOCYTES # BLD AUTO: 1.46 K/UL — HIGH (ref 0–0.9)
MONOCYTES NFR BLD AUTO: 13.5 % — SIGNIFICANT CHANGE UP (ref 2–14)
NEUTROPHILS # BLD AUTO: 7.58 K/UL — HIGH (ref 1.8–7.4)
NEUTROPHILS NFR BLD AUTO: 70.3 % — SIGNIFICANT CHANGE UP (ref 43–77)
NRBC # BLD: 0 /100 WBCS — SIGNIFICANT CHANGE UP (ref 0–0)
PHOSPHATE SERPL-MCNC: 3 MG/DL — SIGNIFICANT CHANGE UP (ref 2.5–4.5)
PHOSPHATE SERPL-MCNC: 3 MG/DL — SIGNIFICANT CHANGE UP (ref 2.5–4.5)
PHOSPHATE SERPL-MCNC: 3.1 MG/DL — SIGNIFICANT CHANGE UP (ref 2.5–4.5)
PHOSPHATE SERPL-MCNC: 3.1 MG/DL — SIGNIFICANT CHANGE UP (ref 2.5–4.5)
PHOSPHATE SERPL-MCNC: 3.4 MG/DL — SIGNIFICANT CHANGE UP (ref 2.5–4.5)
PLATELET # BLD AUTO: 214 K/UL — SIGNIFICANT CHANGE UP (ref 150–400)
PLATELET # BLD AUTO: 293 K/UL — SIGNIFICANT CHANGE UP (ref 150–400)
PLATELET # BLD AUTO: 299 K/UL — SIGNIFICANT CHANGE UP (ref 150–400)
PLATELET # BLD AUTO: 301 K/UL — SIGNIFICANT CHANGE UP (ref 150–400)
PLATELET # BLD AUTO: 321 K/UL — SIGNIFICANT CHANGE UP (ref 150–400)
PLATELET # BLD AUTO: 325 K/UL — SIGNIFICANT CHANGE UP (ref 150–400)
POTASSIUM SERPL-MCNC: 3.3 MMOL/L — LOW (ref 3.5–5.3)
POTASSIUM SERPL-MCNC: 3.6 MMOL/L — SIGNIFICANT CHANGE UP (ref 3.5–5.3)
POTASSIUM SERPL-MCNC: 3.7 MMOL/L — SIGNIFICANT CHANGE UP (ref 3.5–5.3)
POTASSIUM SERPL-MCNC: 3.9 MMOL/L — SIGNIFICANT CHANGE UP (ref 3.5–5.3)
POTASSIUM SERPL-MCNC: 3.9 MMOL/L — SIGNIFICANT CHANGE UP (ref 3.5–5.3)
POTASSIUM SERPL-MCNC: 4 MMOL/L — SIGNIFICANT CHANGE UP (ref 3.5–5.3)
POTASSIUM SERPL-SCNC: 3.3 MMOL/L — LOW (ref 3.5–5.3)
POTASSIUM SERPL-SCNC: 3.6 MMOL/L — SIGNIFICANT CHANGE UP (ref 3.5–5.3)
POTASSIUM SERPL-SCNC: 3.7 MMOL/L — SIGNIFICANT CHANGE UP (ref 3.5–5.3)
POTASSIUM SERPL-SCNC: 3.9 MMOL/L — SIGNIFICANT CHANGE UP (ref 3.5–5.3)
POTASSIUM SERPL-SCNC: 3.9 MMOL/L — SIGNIFICANT CHANGE UP (ref 3.5–5.3)
POTASSIUM SERPL-SCNC: 4 MMOL/L — SIGNIFICANT CHANGE UP (ref 3.5–5.3)
RBC # BLD: 3.25 M/UL — LOW (ref 3.8–5.2)
RBC # BLD: 3.28 M/UL — LOW (ref 3.8–5.2)
RBC # BLD: 3.33 M/UL — LOW (ref 3.8–5.2)
RBC # BLD: 3.37 M/UL — LOW (ref 3.8–5.2)
RBC # BLD: 3.54 M/UL — LOW (ref 3.8–5.2)
RBC # BLD: 3.64 M/UL — LOW (ref 3.8–5.2)
RBC # FLD: 19.7 % — HIGH (ref 10.3–14.5)
RBC # FLD: 19.8 % — HIGH (ref 10.3–14.5)
RBC # FLD: 19.8 % — HIGH (ref 10.3–14.5)
RBC # FLD: 19.9 % — HIGH (ref 10.3–14.5)
RBC # FLD: 20.2 % — HIGH (ref 10.3–14.5)
RBC # FLD: 20.2 % — HIGH (ref 10.3–14.5)
SARS-COV-2 RNA SPEC QL NAA+PROBE: DETECTED
SODIUM SERPL-SCNC: 135 MMOL/L — SIGNIFICANT CHANGE UP (ref 135–145)
SODIUM SERPL-SCNC: 135 MMOL/L — SIGNIFICANT CHANGE UP (ref 135–145)
SODIUM SERPL-SCNC: 137 MMOL/L — SIGNIFICANT CHANGE UP (ref 135–145)
SODIUM SERPL-SCNC: 138 MMOL/L — SIGNIFICANT CHANGE UP (ref 135–145)
SODIUM SERPL-SCNC: 139 MMOL/L — SIGNIFICANT CHANGE UP (ref 135–145)
SODIUM SERPL-SCNC: 142 MMOL/L — SIGNIFICANT CHANGE UP (ref 135–145)
SPECIMEN SOURCE: SIGNIFICANT CHANGE UP
WBC # BLD: 10.24 K/UL — SIGNIFICANT CHANGE UP (ref 3.8–10.5)
WBC # BLD: 10.79 K/UL — HIGH (ref 3.8–10.5)
WBC # BLD: 12.38 K/UL — HIGH (ref 3.8–10.5)
WBC # BLD: 8.69 K/UL — SIGNIFICANT CHANGE UP (ref 3.8–10.5)
WBC # BLD: 9.12 K/UL — SIGNIFICANT CHANGE UP (ref 3.8–10.5)
WBC # BLD: 9.36 K/UL — SIGNIFICANT CHANGE UP (ref 3.8–10.5)
WBC # FLD AUTO: 10.24 K/UL — SIGNIFICANT CHANGE UP (ref 3.8–10.5)
WBC # FLD AUTO: 10.79 K/UL — HIGH (ref 3.8–10.5)
WBC # FLD AUTO: 12.38 K/UL — HIGH (ref 3.8–10.5)
WBC # FLD AUTO: 8.69 K/UL — SIGNIFICANT CHANGE UP (ref 3.8–10.5)
WBC # FLD AUTO: 9.12 K/UL — SIGNIFICANT CHANGE UP (ref 3.8–10.5)
WBC # FLD AUTO: 9.36 K/UL — SIGNIFICANT CHANGE UP (ref 3.8–10.5)

## 2022-01-01 PROCEDURE — U0005: CPT

## 2022-01-01 PROCEDURE — 93005 ELECTROCARDIOGRAM TRACING: CPT

## 2022-01-01 PROCEDURE — 80053 COMPREHEN METABOLIC PANEL: CPT

## 2022-01-01 PROCEDURE — 36415 COLL VENOUS BLD VENIPUNCTURE: CPT

## 2022-01-01 PROCEDURE — 97110 THERAPEUTIC EXERCISES: CPT

## 2022-01-01 PROCEDURE — 80048 BASIC METABOLIC PNL TOTAL CA: CPT

## 2022-01-01 PROCEDURE — 99285 EMERGENCY DEPT VISIT HI MDM: CPT | Mod: 25

## 2022-01-01 PROCEDURE — 85025 COMPLETE CBC W/AUTO DIFF WBC: CPT

## 2022-01-01 PROCEDURE — 97162 PT EVAL MOD COMPLEX 30 MIN: CPT

## 2022-01-01 PROCEDURE — 82962 GLUCOSE BLOOD TEST: CPT

## 2022-01-01 PROCEDURE — 71045 X-RAY EXAM CHEST 1 VIEW: CPT

## 2022-01-01 PROCEDURE — 96374 THER/PROPH/DIAG INJ IV PUSH: CPT

## 2022-01-01 PROCEDURE — 85027 COMPLETE CBC AUTOMATED: CPT

## 2022-01-01 PROCEDURE — 81001 URINALYSIS AUTO W/SCOPE: CPT

## 2022-01-01 PROCEDURE — 97530 THERAPEUTIC ACTIVITIES: CPT

## 2022-01-01 PROCEDURE — U0003: CPT

## 2022-01-01 PROCEDURE — 84100 ASSAY OF PHOSPHORUS: CPT

## 2022-01-01 PROCEDURE — 83735 ASSAY OF MAGNESIUM: CPT

## 2022-01-01 PROCEDURE — 87086 URINE CULTURE/COLONY COUNT: CPT

## 2022-01-01 RX ORDER — CEFTRIAXONE 500 MG/1
1000 INJECTION, POWDER, FOR SOLUTION INTRAMUSCULAR; INTRAVENOUS EVERY 24 HOURS
Refills: 0 | Status: DISCONTINUED | OUTPATIENT
Start: 2022-01-01 | End: 2022-01-01

## 2022-01-01 RX ORDER — METOPROLOL TARTRATE 50 MG
50 TABLET ORAL
Refills: 0 | Status: DISCONTINUED | OUTPATIENT
Start: 2022-01-01 | End: 2022-01-01

## 2022-01-01 RX ORDER — POTASSIUM CHLORIDE 20 MEQ
20 PACKET (EA) ORAL ONCE
Refills: 0 | Status: COMPLETED | OUTPATIENT
Start: 2022-01-01 | End: 2022-01-01

## 2022-01-01 RX ORDER — ZOLPIDEM TARTRATE 10 MG/1
1 TABLET ORAL
Qty: 0 | Refills: 0 | DISCHARGE

## 2022-01-01 RX ORDER — POTASSIUM CHLORIDE 20 MEQ
1 PACKET (EA) ORAL
Qty: 0 | Refills: 0 | DISCHARGE

## 2022-01-01 RX ORDER — AMLODIPINE BESYLATE 2.5 MG/1
5 TABLET ORAL DAILY
Refills: 0 | Status: DISCONTINUED | OUTPATIENT
Start: 2022-01-01 | End: 2022-01-01

## 2022-01-01 RX ORDER — HALOPERIDOL DECANOATE 100 MG/ML
0.5 INJECTION INTRAMUSCULAR ONCE
Refills: 0 | Status: COMPLETED | OUTPATIENT
Start: 2022-01-01 | End: 2022-01-01

## 2022-01-01 RX ADMIN — AMLODIPINE BESYLATE 5 MILLIGRAM(S): 2.5 TABLET ORAL at 06:38

## 2022-01-01 RX ADMIN — ENOXAPARIN SODIUM 40 MILLIGRAM(S): 100 INJECTION SUBCUTANEOUS at 13:08

## 2022-01-01 RX ADMIN — PANTOPRAZOLE SODIUM 40 MILLIGRAM(S): 20 TABLET, DELAYED RELEASE ORAL at 06:20

## 2022-01-01 RX ADMIN — PANTOPRAZOLE SODIUM 40 MILLIGRAM(S): 20 TABLET, DELAYED RELEASE ORAL at 06:30

## 2022-01-01 RX ADMIN — Medication 50 MILLIGRAM(S): at 18:31

## 2022-01-01 RX ADMIN — Medication 50 MILLIGRAM(S): at 06:34

## 2022-01-01 RX ADMIN — AMLODIPINE BESYLATE 5 MILLIGRAM(S): 2.5 TABLET ORAL at 06:34

## 2022-01-01 RX ADMIN — CEFTRIAXONE 100 MILLIGRAM(S): 500 INJECTION, POWDER, FOR SOLUTION INTRAMUSCULAR; INTRAVENOUS at 23:00

## 2022-01-01 RX ADMIN — PANTOPRAZOLE SODIUM 40 MILLIGRAM(S): 20 TABLET, DELAYED RELEASE ORAL at 06:40

## 2022-01-01 RX ADMIN — Medication 20 MILLIEQUIVALENT(S): at 13:08

## 2022-01-01 RX ADMIN — AMLODIPINE BESYLATE 5 MILLIGRAM(S): 2.5 TABLET ORAL at 05:13

## 2022-01-01 RX ADMIN — Medication 50 MILLIGRAM(S): at 07:57

## 2022-01-01 RX ADMIN — ENOXAPARIN SODIUM 40 MILLIGRAM(S): 100 INJECTION SUBCUTANEOUS at 11:24

## 2022-01-01 RX ADMIN — ENOXAPARIN SODIUM 40 MILLIGRAM(S): 100 INJECTION SUBCUTANEOUS at 13:18

## 2022-01-01 RX ADMIN — Medication 50 MILLIGRAM(S): at 06:30

## 2022-01-01 RX ADMIN — Medication 50 MILLIGRAM(S): at 05:37

## 2022-01-01 RX ADMIN — Medication 50 MILLIGRAM(S): at 17:55

## 2022-01-01 RX ADMIN — Medication 50 MILLIGRAM(S): at 18:01

## 2022-01-01 RX ADMIN — CEFTRIAXONE 100 MILLIGRAM(S): 500 INJECTION, POWDER, FOR SOLUTION INTRAMUSCULAR; INTRAVENOUS at 05:45

## 2022-01-01 RX ADMIN — CEFTRIAXONE 100 MILLIGRAM(S): 500 INJECTION, POWDER, FOR SOLUTION INTRAMUSCULAR; INTRAVENOUS at 23:48

## 2022-01-01 RX ADMIN — HALOPERIDOL DECANOATE 0.5 MILLIGRAM(S): 100 INJECTION INTRAMUSCULAR at 15:31

## 2022-01-01 RX ADMIN — AMLODIPINE BESYLATE 5 MILLIGRAM(S): 2.5 TABLET ORAL at 06:30

## 2022-01-01 RX ADMIN — Medication 50 MILLIGRAM(S): at 18:12

## 2022-01-01 RX ADMIN — PANTOPRAZOLE SODIUM 40 MILLIGRAM(S): 20 TABLET, DELAYED RELEASE ORAL at 06:34

## 2022-01-01 RX ADMIN — ENOXAPARIN SODIUM 40 MILLIGRAM(S): 100 INJECTION SUBCUTANEOUS at 13:16

## 2022-01-01 RX ADMIN — Medication 50 MILLIGRAM(S): at 18:29

## 2022-01-01 RX ADMIN — ENOXAPARIN SODIUM 40 MILLIGRAM(S): 100 INJECTION SUBCUTANEOUS at 13:30

## 2022-01-01 RX ADMIN — AMLODIPINE BESYLATE 5 MILLIGRAM(S): 2.5 TABLET ORAL at 05:37

## 2022-01-01 RX ADMIN — Medication 100 MILLIGRAM(S): at 05:13

## 2022-01-01 RX ADMIN — ENOXAPARIN SODIUM 40 MILLIGRAM(S): 100 INJECTION SUBCUTANEOUS at 12:58

## 2022-01-01 NOTE — PHYSICAL THERAPY INITIAL EVALUATION ADULT - PERTINENT HX OF CURRENT PROBLEM, REHAB EVAL
89 y/o Kiswahili speaking female admitted to [facility] on 12/31/21 with PMHx of A. fib, HTN, GERD, BIB by grandson for FTT at home. PT daughter/caretaker was admitted to hospital a few days ago and since admission, pt has refused to eat and drink at home. Last meal was this morning and she ate some oatmeal. Pt is otherwise unable to care for herself. Grandson brought pt in and states he cannot care for her at home either. 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

## 2022-01-01 NOTE — PHYSICAL THERAPY INITIAL EVALUATION ADULT - ADDITIONAL COMMENTS
lives w/ dtr who is primary caretaker and cannot get social history 2/ 2 to pt w/dementia and as per chart dtr is hospitalized

## 2022-01-01 NOTE — PHYSICAL THERAPY INITIAL EVALUATION ADULT - PRECAUTIONS/LIMITATIONS, REHAB EVAL
dementia/cardiac precautions/fall precautions dementia/ contact and droplet precautions/cardiac precautions/fall precautions/isolation precautions

## 2022-01-01 NOTE — PROGRESS NOTE ADULT - ASSESSMENT
A/P     Adult FTT : decrease oral intake / worsening dementia :  -supportive care   IV fluids     covid -19 pos :  -symptomatic   -no hypoxia   will hold off on RDV/ dexa   will start lovenox     Hx of CAD / HTN / ch afib   not on any AC : likely 2/2 dementia and fall risk   -will c/w home BP meds   rate is controlled     adv alz dementia :  -supportive care   will need placement as no one at home to take care of her     dispo: doing well , on no oxygen . will need placement

## 2022-01-01 NOTE — PROGRESS NOTE ADULT - ASSESSMENT
A/P     Adult FTT : decrease oral intake / worsening dementia :  -supportive care   IV fluids     covid -19 pos :  -symptomatic   -no hypoxia   will hold off on RDV/ dexa   will start lovenox     Hx of CAD / HTN / ch afib   not on any AC : likely 2/2 dementia and fall risk   -will c/w home BP meds   rate is controlled     adv alz dementia :  -supportive care   will need placement as no one at home to take care of her     dispo: advance care planning : need to d/w family regarding advance directive . prognosis remain gaurded and she can't make decision for herself .   remain full code for now . will d/w family in morning  89 y/o Lithuanian speaking female with PMHx of A. fib, HTN, GERD, BIB by grandson for FTT at home. PT daughter/caretaker was admitted to hospital a few days ago and since admission, pt has refused to eat and drink at home. Last meal was this morning and she ate some oatmeal. Pt is otherwise unable to care for herself. Grandson brought pt in and states he cannot care for her at home either. 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 Pt refuses to answer most questions when she is spoken to. Has been bed bound the past week.    #UTI  - on admission, leukocytosis, no fever, asymptomatic  - UA+  - c/w ceftriaxone 1g q24h  - pending UCx    #FTT :   - decrease oral intake / worsening dementia, currently AOx1 (bed-bound, not following commands, non-redirectable), possibly  2/2 UTI  - supportive care   - s/p IVF bolus  - will need placement as no one at home can take care of her    #COVID 19  - asymptomatic, without hypoxia   - will hold off on RDV/ dexa   - c/w lovenox 40 subq qd    #HTN  - SBP 100s today, need strict bp regimen  - Reduce metoprolol tartrate 50mg BID in setting of low BP  - c/w amlodipine 5mg qd    # chronic afib   - not on any AC : likely 2/2 dementia and fall risk   - c/w amlodipine and metorpolol as above  - rate is controlled     #GERD  - c/w home meds    Diet: minced and most  Dispo: advance care planning : SW following, daughter wants Covid LARA then return to home, attending will discuss code status with daughter (who is hospitalized)  DVT: lovenox  - Full code

## 2022-01-01 NOTE — PROGRESS NOTE ADULT - SUBJECTIVE AND OBJECTIVE BOX
Patient:  GOLDEN YOUNG  [id number]    Progress Note    Interval events: No acute events.  Pertinent ROS (if any):      Administered:  pantoprazole    Tablet: 40 milliGRAM(s) Oral ( @ 06:20)  cefTRIAXone   IVPB: 100 mL/Hr IV Intermittent ( @ 05:45)  metoprolol tartrate: 100 milliGRAM(s) Oral ( @ 05:13)  amLODIPine   Tablet: 5 milliGRAM(s) Oral ( @ 05:13)        OBJECTIVE:    CAPILLARY BLOOD GLUCOSE      POCT Blood Glucose.: 103 mg/dL (31 Dec 2021 17:10)        VITALS:  T(F): 100 (22 @ 04:53), Max: 100.4 (21 @ 10:45)  HR: 68 (22 @ 04:53) (68 - 100)  BP: 102/66 (22 @ 04:53) (102/66 - 140/79)  BP(mean): --  ABP: --  ABP(mean): --  RR: 20 (22 @ 04:53) (16 - 20)  SpO2: 95% (22 @ 04:53) (95% - 100%)    PHYSICAL EXAM:  GENERAL: NAD, lying in bed comfortably  HEAD:  Atraumatic, Normocephalic  EYES: EOMI, PERRLA, conjunctiva and sclera clear  ENT: Moist mucous membranes  NECK: Supple, No JVD  CHEST/LUNG: Clear to auscultation bilaterally; No rales, rhonchi, wheezing, or rubs. Unlabored respirations  HEART: Regular rate and rhythm; No murmurs, rubs, or gallops  ABDOMEN: Bowel sounds present; Soft, Nontender, Nondistended. No hepatomegaly  EXTREMITIES:  2+ Peripheral Pulses, brisk capillary refill. No clubbing, cyanosis, or edema  NERVOUS SYSTEM:  Alert & Oriented X3, speech clear. No deficits   MSK: FROM all 4 extremities, full and equal strength  SKIN: No rashes or lesions    HOSPITAL MEDICATIONS:  Standing Meds:  amLODIPine   Tablet 5 milliGRAM(s) Oral daily  cefTRIAXone   IVPB 1000 milliGRAM(s) IV Intermittent every 24 hours  enoxaparin Injectable 40 milliGRAM(s) SubCutaneous daily  metoprolol tartrate 100 milliGRAM(s) Oral two times a day  pantoprazole    Tablet 40 milliGRAM(s) Oral before breakfast      PRN Meds:  acetaminophen     Tablet .. 650 milliGRAM(s) Oral every 6 hours PRN      LABS:  CBC 22 @ 06:46                        9.7    12.38 )-----------( 214                   30.0       Hgb trend: 9.7 <-- , 11.9 <--   WBC trend: 12.38 <-- , 15.54 <--       CMP 22 @ 06:46    137  |  102  |  17  ----------------------------<  88  3.9   |  20<L>  |  0.90    Ca    8.7      22 @ 06:46    TPro  7.1  /  Alb  2.4<L>  /  TBili  0.9  /  DBili  x   /  AST  46<H>  /  ALT  9<L>  /  AlkPhos  61        Serum Cr trend: 0.90 <-- , 0.97 <-- , 0.72 <-- , 1.00 <--       Urinalysis Basic - ( 31 Dec 2021 11:21 )    Color: Yellow / Appearance: Slightly Turbid / S.022 / pH: x  Gluc: x / Ketone: Small  / Bili: Negative / Urobili: Negative   Blood: x / Protein: 30 mg/dL / Nitrite: Negative   Leuk Esterase: Large / RBC: 3 /hpf / WBC 76 /HPF   Sq Epi: x / Non Sq Epi: 6 /hpf / Bacteria: Negative      ABG Trend:     VBG Trend:       MICROBIOLOGY:       RADIOLOGY:  [ ] Reviewed and interpreted by me    EKG:   Patient:  GOLDEN YOUNG  [id number]    Progress Note    Interval events: No acute events. Patient AOx1, not following commands, easily redirectable. Not cooperative with Exam.  Spoke to grandson/SW, tentative plan for patient to being admitted temporarily to COVID+ rehab, then back home. Attending will discuss code status with daughter who is currently hospitalized as well.   Pertinent ROS (if any):  Denies f/c/n/v/cp/ap/bowel or bladder changes.     Administered:  pantoprazole    Tablet: 40 milliGRAM(s) Oral ( @ 06:20)  cefTRIAXone   IVPB: 100 mL/Hr IV Intermittent ( @ 05:45)  metoprolol tartrate: 100 milliGRAM(s) Oral ( @ 05:13)  amLODIPine   Tablet: 5 milliGRAM(s) Oral ( @ 05:13)    OBJECTIVE:    CAPILLARY BLOOD GLUCOSE    POCT Blood Glucose.: 103 mg/dL (31 Dec 2021 17:10)      VITALS:  T(F): 100 (22 @ 04:53), Max: 100.4 (21 @ 10:45)  HR: 68 (22 @ 04:53) (68 - 100)  BP: 102/66 (22 @ 04:53) (102/66 - 140/79)  BP(mean): --  ABP: --  ABP(mean): --  RR: 20 (22 @ 04:53) (16 - 20)  SpO2: 95% (22 @ 04:53) (95% - 100%)    PHYSICAL EXAM:  GENERAL: NAD, lying in bed comfortably, AOx1, not following commands, reactive to pain  HEAD:  Atraumatic, Normocephalic  EYES: EOMI, PERRLA, conjunctiva and sclera clear  ENT: Moist mucous membranes  NECK: Supple, No JVD  CHEST/LUNG: Clear to auscultation bilaterally; No rales, rhonchi, wheezing, or rubs. Unlabored respirations  HEART: Regular rate and rhythm; No murmurs, rubs, or gallops  ABDOMEN: Bowel sounds present; Soft, Nontender, Nondistended. No hepatomegaly  EXTREMITIES:  2+ Peripheral Pulses, brisk capillary refill. No clubbing, cyanosis, or edema  NERVOUS SYSTEM:  Alert & Oriented x1, speech clear. No deficits   MSK: FROM all 4 extremities, full and equal strength. Not cooperative with exam.  SKIN: No rashes or lesions    HOSPITAL MEDICATIONS:  Standing Meds:  amLODIPine   Tablet 5 milliGRAM(s) Oral daily  cefTRIAXone   IVPB 1000 milliGRAM(s) IV Intermittent every 24 hours  enoxaparin Injectable 40 milliGRAM(s) SubCutaneous daily  metoprolol tartrate 100 milliGRAM(s) Oral two times a day  pantoprazole    Tablet 40 milliGRAM(s) Oral before breakfast      PRN Meds:  acetaminophen     Tablet .. 650 milliGRAM(s) Oral every 6 hours PRN      LABS:  CBC 22 @ 06:46                        9.7    12.38 )-----------( 214                   30.0       Hgb trend: 9.7 <-- , 11.9 <--   WBC trend: 12.38 <-- , 15.54 <--       CMP 22 @ 06:46    137  |  102  |  17  ----------------------------<  88  3.9   |  20<L>  |  0.90    Ca    8.7      22 @ 06:46    TPro  7.1  /  Alb  2.4<L>  /  TBili  0.9  /  DBili  x   /  AST  46<H>  /  ALT  9<L>  /  AlkPhos  61  1231    Serum Cr trend: 0.90 <-- , 0.97 <-- , 0.72 <-- , 1.00 <--     Urinalysis Basic - ( 31 Dec 2021 11:21 )    Color: Yellow / Appearance: Slightly Turbid / S.022 / pH: x  Gluc: x / Ketone: Small  / Bili: Negative / Urobili: Negative   Blood: x / Protein: 30 mg/dL / Nitrite: Negative   Leuk Esterase: Large / RBC: 3 /hpf / WBC 76 /HPF   Sq Epi: x / Non Sq Epi: 6 /hpf / Bacteria: Negative      ABG Trend:     VBG Trend:       MICROBIOLOGY:       RADIOLOGY:  [ ] Reviewed and interpreted by me    EKG:

## 2022-01-01 NOTE — PROGRESS NOTE ADULT - SUBJECTIVE AND OBJECTIVE BOX
Patient is a 89y old  Female who presents with a chief complaint of 6adult FTT / covid pos / dementia (2022 07:47)      INTERVAL HPI/OVERNIGHT EVENTS: adv dementia , NAD  T(C): 37.1 (22 @ 21:37), Max: 37.8 (22 @ 04:53)  HR: 85 (22 @ 21:37) (68 - 97)  BP: 119/59 (22 @ 21:37) (102/66 - 140/79)  RR: 18 (22 @ 21:37) (18 - 20)  SpO2: 99% (22 @ 21:37) (95% - 100%)  Wt(kg): --  I&O's Summary    2022 07:01  -  2022 23:28  --------------------------------------------------------  IN: 250 mL / OUT: 0 mL / NET: 250 mL        PAST MEDICAL & SURGICAL HISTORY:  Acute Coronary Syndrome    Atrial Fibrillation    Hypertension    Renal Calculi    History of Colon Cancer    S/P Cardiac Pacemaker Procedure    Tachy-Francis Syndrome    Chronic Constipation    H/O gastroesophageal reflux (GERD)    History of Appendectomy    S/P LAVERNE (Total Abdominal Hysterectomy)    History of Colon Resection    Open fracture of right hip  ORIF of Rt Hip ()    Pacemaker        SOCIAL HISTORY  Alcohol:  Tobacco:  Illicit substance use:    FAMILY HISTORY:    REVIEW OF SYSTEMS:  CONSTITUTIONAL: No fever, weight loss, or fatigue  EYES: No eye pain, visual disturbances, or discharge  ENMT:  No difficulty hearing, tinnitus, vertigo; No sinus or throat pain  NECK: No pain or stiffness  RESPIRATORY: No cough, wheezing, chills or hemoptysis; No shortness of breath  CARDIOVASCULAR: No chest pain, palpitations, dizziness, or leg swelling  GASTROINTESTINAL: No abdominal or epigastric pain. No nausea, vomiting, or hematemesis; No diarrhea or constipation. No melena or hematochezia.  GENITOURINARY: No dysuria, frequency, hematuria, or incontinence  NEUROLOGICAL: No headaches, memory loss, loss of strength, numbness, or tremors  SKIN: No itching, burning, rashes, or lesions   LYMPH NODES: No enlarged glands  ENDOCRINE: No heat or cold intolerance; No hair loss  MUSCULOSKELETAL: No joint pain or swelling; No muscle, back, or extremity pain  PSYCHIATRIC: No depression, anxiety, mood swings, or difficulty sleeping  HEME/LYMPH: No easy bruising, or bleeding gums  ALLERY AND IMMUNOLOGIC: No hives or eczema    RADIOLOGY & ADDITIONAL TESTS:    Imaging Personally Reviewed:  [ ] YES  [ ] NO    Consultant(s) Notes Reviewed:  [ ] YES  [ ] NO    PHYSICAL EXAM:  GENERAL: NAD, well-groomed, well-developed  HEAD:  Atraumatic, Normocephalic  EYES: EOMI, PERRLA, conjunctiva and sclera clear  ENMT: No tonsillar erythema, exudates, or enlargement; Moist mucous membranes, Good dentition, No lesions  NECK: Supple, No JVD, Normal thyroid  NERVOUS SYSTEM:  Alert & Oriented X3, Good concentration; Motor Strength 5/5 B/L upper and lower extremities; DTRs 2+ intact and symmetric  CHEST/LUNG: Clear to percussion bilaterally; No rales, rhonchi, wheezing, or rubs  HEART: Regular rate and rhythm; No murmurs, rubs, or gallops  ABDOMEN: Soft, Nontender, Nondistended; Bowel sounds present  EXTREMITIES:  2+ Peripheral Pulses, No clubbing, cyanosis, or edema  LYMPH: No lymphadenopathy noted  SKIN: No rashes or lesions    LABS:                        9.7    12.38 )-----------( 214      ( 2022 06:46 )             30.0     01-    137  |  102  |  17  ----------------------------<  88  3.9   |  20<L>  |  0.90    Ca    8.7      2022 06:46    TPro  7.1  /  Alb  2.4<L>  /  TBili  0.9  /  DBili  x   /  AST  46<H>  /  ALT  9<L>  /  AlkPhos  61        Urinalysis Basic - ( 31 Dec 2021 11:21 )    Color: Yellow / Appearance: Slightly Turbid / S.022 / pH: x  Gluc: x / Ketone: Small  / Bili: Negative / Urobili: Negative   Blood: x / Protein: 30 mg/dL / Nitrite: Negative   Leuk Esterase: Large / RBC: 3 /hpf / WBC 76 /HPF   Sq Epi: x / Non Sq Epi: 6 /hpf / Bacteria: Negative      CAPILLARY BLOOD GLUCOSE            Urinalysis Basic - ( 31 Dec 2021 11:21 )    Color: Yellow / Appearance: Slightly Turbid / S.022 / pH: x  Gluc: x / Ketone: Small  / Bili: Negative / Urobili: Negative   Blood: x / Protein: 30 mg/dL / Nitrite: Negative   Leuk Esterase: Large / RBC: 3 /hpf / WBC 76 /HPF   Sq Epi: x / Non Sq Epi: 6 /hpf / Bacteria: Negative        MEDICATIONS  (STANDING):  amLODIPine   Tablet 5 milliGRAM(s) Oral daily  cefTRIAXone   IVPB 1000 milliGRAM(s) IV Intermittent every 24 hours  enoxaparin Injectable 40 milliGRAM(s) SubCutaneous daily  metoprolol tartrate 50 milliGRAM(s) Oral two times a day  pantoprazole    Tablet 40 milliGRAM(s) Oral before breakfast    MEDICATIONS  (PRN):  acetaminophen     Tablet .. 650 milliGRAM(s) Oral every 6 hours PRN Temp greater or equal to 38C (100.4F), Moderate Pain (4 - 6)      Care Discussed with Consultants/Other Providers [ ] YES  [ ] NO

## 2022-01-02 NOTE — DISCHARGE NOTE PROVIDER - HOSPITAL COURSE
89 y/o Central African speaking female with PMHx of A. fib (s/p PPM), HTN, GERD, BIB by grandson for FTT at home. PT daughter/caretaker was admitted to hospital a few days ago and since admission, pt has refused to eat and drink at home. Last meal was this morning and she ate some oatmeal. Pt is otherwise unable to care for herself. Grandson brought pt in and states he cannot care for her at home either. 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 Pt refuses to answer most questions when she is spoken to. Has been bed bound the past week.    Patient COVID-19 PCR positive, however asymptomatic, on room air (baseline). Patient admitted for contact and airborne precautions. Patient had weakly positive UA and leukocytosis, however urine culture was negative. Patient completed short course of IV ceftriaxone. Patient hypotensive and therefore home metoprolol dose decreased. Family wishes for patient to return home with plan for private hire for ongoing care, as discussed with social work. PT recommend home with no PT needs.     At this time, ____ 89 y/o Venezuelan speaking female with PMHx of A. fib (s/p PPM), HTN, GERD, BIB by grandson for FTT at home. PT daughter/caretaker was admitted to hospital a few days ago and since admission, pt has refused to eat and drink at home. Last meal was this morning and she ate some oatmeal. Pt is otherwise unable to care for herself. Grandson brought pt in and states he cannot care for her at home either. 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 Pt refuses to answer most questions when she is spoken to. Has been bed bound the past week.    Patient COVID-19 PCR positive, however asymptomatic, on room air (baseline). Patient admitted for contact and airborne precautions. Patient had weakly positive UA and leukocytosis, however urine culture was negative. Patient completed short course of IV ceftriaxone. Patient hypotensive and therefore home metoprolol dose decreased. Family wishes for patient to return home with plan for private hire for ongoing care, as discussed with social work. PT recommend home with no PT needs.     At this time, patient is stable for discharge home.

## 2022-01-02 NOTE — PROGRESS NOTE ADULT - ASSESSMENT
89 y/o Georgian speaking female with PMHx of A. fib, HTN, GERD, BIB by grandson for FTT at home. PT daughter/caretaker was admitted to hospital a few days ago and since admission, pt has refused to eat and drink at home. Last meal was this morning and she ate some oatmeal. Pt is otherwise unable to care for herself. Grandson brought pt in and states he cannot care for her at home either. 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 Pt refuses to answer most questions when she is spoken to. Has been bed bound the past week.    #UTI  - on admission, leukocytosis, no fever, asymptomatic  - UA+  - c/w ceftriaxone 1g q24h  - pending UCx    #FTT :   - decrease oral intake / worsening dementia, currently AOx1 (bed-bound, not following commands, non-redirectable), possibly  2/2 UTI  - supportive care   - s/p IVF bolus  - will need placement as no one at home can take care of her    #COVID 19  - asymptomatic, without hypoxia   - will hold off on RDV/ dexa   - c/w lovenox 40 subq qd    #HTN  - SBP 100s today, need strict bp regimen  - Reduce metoprolol tartrate 50mg BID in setting of low BP  - c/w amlodipine 5mg qd    # chronic afib   - not on any AC : likely 2/2 dementia and fall risk   - c/w amlodipine and metorpolol as above  - rate is controlled     #GERD  - c/w home meds    Diet: minced and most  Dispo: advance care planning : SW following, daughter wants Covid [facility] then return to home, attending will discuss code status with daughter (who is hospitalized)  DVT: lovenox  - Full code

## 2022-01-02 NOTE — PROGRESS NOTE ADULT - SUBJECTIVE AND OBJECTIVE BOX
Patient is a 89y old  Female who presents with a chief complaint of 6adult FTT / covid pos / dementia (02 Jan 2022 07:51)      INTERVAL HPI/OVERNIGHT EVENTS: seen and examined , baseline dementia , not on oxygen , no distress   T(C): 36.4 (01-02-22 @ 14:37), Max: 37.1 (01-01-22 @ 21:37)  HR: 99 (01-02-22 @ 14:37) (69 - 99)  BP: 166/89 (01-02-22 @ 14:37) (119/59 - 166/89)  RR: 18 (01-02-22 @ 14:37) (18 - 20)  SpO2: 100% (01-02-22 @ 14:37) (97% - 100%)  Wt(kg): --  I&O's Summary    01 Jan 2022 07:01  -  02 Jan 2022 07:00  --------------------------------------------------------  IN: 250 mL / OUT: 0 mL / NET: 250 mL    02 Jan 2022 07:01  -  02 Jan 2022 19:54  --------------------------------------------------------  IN: 225 mL / OUT: 0 mL / NET: 225 mL        PAST MEDICAL & SURGICAL HISTORY:  Acute Coronary Syndrome    Atrial Fibrillation    Hypertension    Renal Calculi    History of Colon Cancer    S/P Cardiac Pacemaker Procedure    Tachy-Francis Syndrome    Chronic Constipation    H/O gastroesophageal reflux (GERD)    History of Appendectomy    S/P LAVERNE (Total Abdominal Hysterectomy)    History of Colon Resection    Open fracture of right hip  ORIF of Rt Hip (2011)    Pacemaker        SOCIAL HISTORY  Alcohol:  Tobacco:  Illicit substance use:    FAMILY HISTORY:    REVIEW OF SYSTEMS:  CONSTITUTIONAL: No fever, weight loss, or fatigue  EYES: No eye pain, visual disturbances, or discharge  ENMT:  No difficulty hearing, tinnitus, vertigo; No sinus or throat pain  NECK: No pain or stiffness  RESPIRATORY: No cough, wheezing, chills or hemoptysis; No shortness of breath  CARDIOVASCULAR: No chest pain, palpitations, dizziness, or leg swelling  GASTROINTESTINAL: No abdominal or epigastric pain. No nausea, vomiting, or hematemesis; No diarrhea or constipation. No melena or hematochezia.  GENITOURINARY: No dysuria, frequency, hematuria, or incontinence  NEUROLOGICAL: No headaches, memory loss, loss of strength, numbness, or tremors  SKIN: No itching, burning, rashes, or lesions   LYMPH NODES: No enlarged glands  ENDOCRINE: No heat or cold intolerance; No hair loss  MUSCULOSKELETAL: No joint pain or swelling; No muscle, back, or extremity pain  PSYCHIATRIC: No depression, anxiety, mood swings, or difficulty sleeping  HEME/LYMPH: No easy bruising, or bleeding gums  ALLERY AND IMMUNOLOGIC: No hives or eczema    RADIOLOGY & ADDITIONAL TESTS:    Imaging Personally Reviewed:  [ ] YES  [ ] NO    Consultant(s) Notes Reviewed:  [ ] YES  [ ] NO    PHYSICAL EXAM:  GENERAL: NAD, well-groomed, well-developed  HEAD:  Atraumatic, Normocephalic  EYES: EOMI, PERRLA, conjunctiva and sclera clear  ENMT: No tonsillar erythema, exudates, or enlargement; Moist mucous membranes, Good dentition, No lesions  NECK: Supple, No JVD, Normal thyroid  NERVOUS SYSTEM:  Alert & Oriented X3, Good concentration; Motor Strength 5/5 B/L upper and lower extremities; DTRs 2+ intact and symmetric  CHEST/LUNG: Clear to percussion bilaterally; No rales, rhonchi, wheezing, or rubs  HEART: Regular rate and rhythm; No murmurs, rubs, or gallops  ABDOMEN: Soft, Nontender, Nondistended; Bowel sounds present  EXTREMITIES:  2+ Peripheral Pulses, No clubbing, cyanosis, or edema  LYMPH: No lymphadenopathy noted  SKIN: No rashes or lesions    LABS:                        9.5    10.24 )-----------( 301      ( 02 Jan 2022 06:59 )             29.9     01-02    135  |  101  |  20  ----------------------------<  98  3.6   |  20<L>  |  0.98    Ca    8.8      02 Jan 2022 06:56  Phos  3.4     01-02  Mg     1.9     01-02          CAPILLARY BLOOD GLUCOSE                MEDICATIONS  (STANDING):  amLODIPine   Tablet 5 milliGRAM(s) Oral daily  cefTRIAXone   IVPB 1000 milliGRAM(s) IV Intermittent every 24 hours  enoxaparin Injectable 40 milliGRAM(s) SubCutaneous daily  metoprolol tartrate 50 milliGRAM(s) Oral two times a day  pantoprazole    Tablet 40 milliGRAM(s) Oral before breakfast    MEDICATIONS  (PRN):  acetaminophen     Tablet .. 650 milliGRAM(s) Oral every 6 hours PRN Temp greater or equal to 38C (100.4F), Moderate Pain (4 - 6)      Care Discussed with Consultants/Other Providers [ ] YES  [ ] NO

## 2022-01-02 NOTE — DISCHARGE NOTE PROVIDER - NSDCCPCAREPLAN_GEN_ALL_CORE_FT
PRINCIPAL DISCHARGE DIAGNOSIS  Diagnosis: Adult failure to thrive  Assessment and Plan of Treatment: You were brought in by your family for decreased appetite/changes in mental status. It was thought that this likely due to recent changes at home, as additional work-up of causes was negative. As a result, you are to return home with additional supervision per your family's wishes.      SECONDARY DISCHARGE DIAGNOSES  Diagnosis: Abnormal urinalysis  Assessment and Plan of Treatment: While hospitalized, you were found to have an abnormal UA, with concern for bacteria infection, although the urine culture was negative. It was unclear if you had urinary symptoms, and it is known that this is a chronic issue. You therefore received a short course of antibiotics (IV ceftriaxone).  If you re-experience urinary symptoms, please return to the hospital for additional management.    Diagnosis: COVID-19  Assessment and Plan of Treatment: While hopsitalized, you were incidentally diagnosed with COVID-19 infection, although you didnt have any symptoms typical of that infection. You were temporaily placed on airborne and isolation precautions and then released from the hospital.  If you experience cough, shortness of breath, upper respiration symptoms, changes in your mental status, or fever, please return to the hospital for additional management.

## 2022-01-02 NOTE — DISCHARGE NOTE PROVIDER - CARE PROVIDER_API CALL
Jerry Barton)  [place] [address]  Phone: [phone]  Fax: [phone]  Established Patient  Follow Up Time: 1 week

## 2022-01-02 NOTE — PROGRESS NOTE ADULT - ASSESSMENT
89 y/o Cymraes speaking female with PMHx of A. fib, HTN, GERD, BIB by grandson for FTT at home. PT daughter/caretaker was admitted to hospital a few days ago and since admission, pt has refused to eat and drink at home. Last meal was this morning and she ate some oatmeal. Pt is otherwise unable to care for herself. Grandson brought pt in and states he cannot care for her at home either. 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 Pt refuses to answer most questions when she is spoken to. Has been bed bound the past week.    #UTI  - on admission, leukocytosis, no fever, asymptomatic  - UA+  - c/w ceftriaxone 1g q24h  - pending UCx    #FTT :   - decrease oral intake / worsening dementia, currently AOx1 (bed-bound, not following commands, non-redirectable), possibly  2/2 UTI  - supportive care   - s/p IVF bolus  - will need placement as no one at home can take care of her    #COVID 19  - asymptomatic, without hypoxia   - will hold off on RDV/ dexa   - c/w lovenox 40 subq qd    #HTN  - SBP 100s today, need strict bp regimen  - Reduce metoprolol tartrate 50mg BID in setting of low BP  - c/w amlodipine 5mg qd    # chronic afib   - not on any AC : likely 2/2 dementia and fall risk   - c/w amlodipine and metorpolol as above  - rate is controlled     #GERD  - c/w home meds    dispo: called and left message to daughter. wanted to d/w her advance directive. pt. is stable to be d/c to rehab or home with home care

## 2022-01-02 NOTE — DISCHARGE NOTE PROVIDER - NSDCMRMEDTOKEN_GEN_ALL_CORE_FT
acetaminophen 325 mg oral tablet: 2 tab(s) orally every 6 hours, As needed, Moderate Pain (4 - 6)  Ambien 5 mg oral tablet: 1 tab(s) orally once a day (at bedtime)  amLODIPine 5 mg oral tablet: 1 tab(s) orally once a day  Ecotrin 81 mg oral delayed release tablet: 1 tab(s) orally once a day  folic acid 1 mg oral tablet: 1 tab(s) orally once a day  Metoprolol Tartrate 100 mg oral tablet: 1 tab(s) orally 2 times a day  Potassium Chloride (Eqv-Klor-Con 10) 10 mEq oral tablet, extended release: 1 tab(s) orally once a day  PriLOSEC 40 mg oral delayed release capsule: 1 cap(s) orally once a day  simvastatin 20 mg oral tablet: 1 tab(s) orally once a day (at bedtime)   acetaminophen 325 mg oral tablet: 2 tab(s) orally every 6 hours, As needed, Moderate Pain (4 - 6)  amLODIPine 5 mg oral tablet: 1 tab(s) orally once a day  Ecotrin 81 mg oral delayed release tablet: 1 tab(s) orally once a day  folic acid 1 mg oral tablet: 1 tab(s) orally once a day  Metoprolol Tartrate 100 mg oral tablet: 1 tab(s) orally 2 times a day  PriLOSEC 40 mg oral delayed release capsule: 1 cap(s) orally once a day  simvastatin 20 mg oral tablet: 1 tab(s) orally once a day (at bedtime)

## 2022-01-02 NOTE — PROGRESS NOTE ADULT - SUBJECTIVE AND OBJECTIVE BOX
[facility]  Internal Medicine  [facility] [address]  [phone]    Patient is a 89y old  Female who presents with a chief complaint of 6adult FTT / covid pos / dementia (2022 19:27)      SUBJECTIVE / OVERNIGHT EVENTS:  - no events overnight; vitals stable    MEDICATIONS  (STANDING):  amLODIPine   Tablet 5 milliGRAM(s) Oral daily  cefTRIAXone   IVPB 1000 milliGRAM(s) IV Intermittent every 24 hours  enoxaparin Injectable 40 milliGRAM(s) SubCutaneous daily  metoprolol tartrate 50 milliGRAM(s) Oral two times a day  pantoprazole    Tablet 40 milliGRAM(s) Oral before breakfast    MEDICATIONS  (PRN):  acetaminophen     Tablet .. 650 milliGRAM(s) Oral every 6 hours PRN Temp greater or equal to 38C (100.4F), Moderate Pain (4 - 6)      PHYSICAL EXAM:  Vital Signs Last 24 Hrs  T(C): 37 (2022 04:26), Max: 37.1 (2022 21:37)  T(F): 98.6 (2022 04:26), Max: 98.7 (2022 21:37)  HR: 69 (2022 04:26) (69 - 97)  BP: 127/79 (2022 04:26) (111/75 - 137/82)  BP(mean): --  RR: 20 (2022 04:26) (18 - 20)  SpO2: 97% (2022 04:26) (97% - 100%)    GENERAL: NAD, well-groomed, well-developed  HEAD:  Atraumatic, Normocephalic  EYES: EOMI, PERRLA, conjunctiva and sclera clear  ENMT: No tonsillar erythema, exudates, or enlargement; Moist mucous membranes, Good dentition, No lesions  NECK: Supple, No JVD, Normal thyroid  NERVOUS SYSTEM:  Alert & Oriented X3, Good concentration; Motor Strength 5/5 B/L upper and lower extremities; DTRs 2+ intact and symmetric  CHEST/LUNG: Clear to percussion bilaterally; No rales, rhonchi, wheezing, or rubs  HEART: Regular rate and rhythm; No murmurs, rubs, or gallops  ABDOMEN: Soft, Nontender, Nondistended; Bowel sounds present  EXTREMITIES:  2+ Peripheral Pulses, No clubbing, cyanosis, or edema  LYMPH: No lymphadenopathy noted  SKIN: No rashes or lesions    ----  I&O's Summary    2022 07:01  -  2022 07:00  --------------------------------------------------------  IN: 250 mL / OUT: 0 mL / NET: 250 mL      ----  LABS:                        9.5    10.24 )-----------( 301      ( 2022 06:59 )             29.9     ----      137  |  102  |  17  ----------------------------<  88  3.9   |  20<L>  |  0.90    Ca    8.7      2022 06:46    TPro  7.1  /  Alb  2.4<L>  /  TBili  0.9  /  DBili  x   /  AST  46<H>  /  ALT  9<L>  /  AlkPhos  61  12-31    ----    ----      ----  Urinalysis Basic - ( 31 Dec 2021 11:21 )    Color: Yellow / Appearance: Slightly Turbid / S.022 / pH: x  Gluc: x / Ketone: Small  / Bili: Negative / Urobili: Negative   Blood: x / Protein: 30 mg/dL / Nitrite: Negative   Leuk Esterase: Large / RBC: 3 /hpf / WBC 76 /HPF   Sq Epi: x / Non Sq Epi: 6 /hpf / Bacteria: Negative      ----    Culture - Urine (collected 31 Dec 2021 14:47)  Source: Catheterized Catheterized  Final Report (2022 13:41):    <10,000 CFU/mL Normal Urogenital Lainey        RADIOLOGY & ADDITIONAL TESTS:  Results Reviewed:   Imaging Personally Reviewed:  Electrocardiogram Personally Reviewed:

## 2022-01-03 NOTE — CONSULT NOTE ADULT - ASSESSMENT
Impression:    Pressure Injury Prophylaxis    Recommend:    1.) Emollient therapy: gentle ski cleansing, pat dry, apply Sween24 moisturizer daily to intact skin  2.) Incontinence Management - incontinence cleanser, pads, pericare BID  3.) Maintain on an alternating air with low air loss surface  4.) Turn and reposition Q 2 hours  5.) Nutrition optimization  6.) Offload heels/feet with complete cair air fluidized boots; ensure that the soles of the feet are not resting on the foot board of the bed.    Care as per medicine. Will not actively follow but will remain available. Please recall for new issues.  Upon [facility] [address] [place] [address] [phone]  Discussed with clinical nurse  Thank you for this consult  Karen Brown, SANDRA-C, CWOCN [id number]

## 2022-01-03 NOTE — CONSULT NOTE ADULT - SUBJECTIVE AND OBJECTIVE BOX
Wound Care Consult Note:    HPI:   89 y/o Turkmen speaking female with PMHx of A. fib, HTN, GERD, BIB by grandson for FTT at home. PT daughter/caretaker was admitted to hospital a few days ago and since admission, pt has refused to eat and drink at home. Last meal was this morning and she ate some oatmeal. Pt is otherwise unable to care for herself. Grandson brought pt in and states he cannot care for her at home either. 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 Pt refuses to answer most questions when she is spoken to. Has been bed bound the past week.    pt. sole caretaker is daughter who her self is in hospital for surgery on 7 Tow . and no one else able to take care of her at home , seen by  in ED and after discussion with family , plan is for STR when she is stable  (31 Dec 2021 18:04)    Request for wound care consult for skin discoloration received from nursing. Ms. Negrete was encountered on an alternating air with low air loss surface. Skin noted to be intact on all bony prominences. Recommend continue prophylactic measures.    PAST MEDICAL & SURGICAL HISTORY:  Acute Coronary Syndrome  Atrial Fibrillation  Hypertension  Renal Calculi  History of Colon Cancer  S/P Cardiac Pacemaker Procedure  Tachy-Francis Syndrome  Chronic Constipation  H/O gastroesophageal reflux (GERD)  History of Appendectomy  S/P LAVERNE (Total Abdominal Hysterectomy)  History of Colon Resection  Open fracture of right hip  ORIF of Rt Hip (2011)  Pacemaker    MEDICATIONS  (STANDING):  amLODIPine   Tablet 5 milliGRAM(s) Oral daily  enoxaparin Injectable 40 milliGRAM(s) SubCutaneous daily  metoprolol tartrate 50 milliGRAM(s) Oral two times a day  pantoprazole    Tablet 40 milliGRAM(s) Oral before breakfast    MEDICATIONS  (PRN):  acetaminophen     Tablet .. 650 milliGRAM(s) Oral every 6 hours PRN Temp greater or equal to 38C (100.4F), Moderate Pain (4 - 6)    Allergies    Demerol HCl (Vomiting)    Intolerances    meperidine (Stomach Upset)    Vital Signs Last 24 Hrs  T(C): 37 (03 Jan 2022 14:56), Max: 37.1 (02 Jan 2022 21:18)  T(F): 98.6 (03 Jan 2022 14:56), Max: 98.7 (02 Jan 2022 21:18)  HR: 92 (03 Jan 2022 14:56) (70 - 92)  BP: 126/65 (03 Jan 2022 14:56) (107/60 - 126/65)  BP(mean): --  RR: 18 (03 Jan 2022 14:56) (16 - 18)  SpO2: 96% (03 Jan 2022 14:56) (96% - 96%)    Physical Exam:  General: Obtunded   Respiratory: no SOB on supplemental O2  Gastrointestinal: soft NT/ND  Neurology: nonverbal, not following commands  Musculoskeletal: no contractures  Vascular: BLE edema equal  Skin:  Left hip, Left back, bilateral heels with discolored intact skin with no drainage  No odor, increased warmth, tenderness, induration, fluctuance    LABS:  01-03    135  |  101  |  18  ----------------------------<  111<H>  3.9   |  20<L>  |  0.77    Ca    9.1      03 Jan 2022 07:37  Phos  3.0     01-03  Mg     1.9     01-03                            10.5   10.79 )-----------( 293      ( 03 Jan 2022 07:59 )             32.8

## 2022-01-03 NOTE — PROGRESS NOTE ADULT - ASSESSMENT
89 y/o Persian speaking female with PMHx of A. fib, HTN, GERD, BIB by grandson for FTT at home. PT daughter/caretaker was admitted to hospital a few days ago and since admission, pt has refused to eat and drink at home. Last meal was this morning and she ate some oatmeal. Pt is otherwise unable to care for herself. Grandson brought pt in and states he cannot care for her at home either. 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 Pt refuses to answer most questions when she is spoken to. Has been bed bound the past week.    #UTI  - on admission, leukocytosis, no fever, asymptomatic  urine c/s neg   off abx     #FTT :   - decrease oral intake / worsening dementia, currently AOx1 (bed-bound, not following commands, non-redirectable), possibly  2/2 UTI  - supportive care   - s/p IVF bolus  - SW spoke with daughter who is hospitalized - she wants patient to return home after they both are discharged from hospital    #COVID 19  - asymptomatic, without hypoxia   - will hold off on RDV/ dexa   - c/w lovenox 40 subq qd    #HTN  - SBP 100s today, need strict bp regimen  - c/w metoprolol tartrate 50mg BID in setting of low BP  - c/w amlodipine 5mg qd    # chronic afib   - not on any AC : likely 2/2 dementia and fall risk   - c/w amlodipine and metoprolol as above  - rate is controlled     #GERD  - c/w home meds    dispo: clinically ok , daughter don't want her to go to [facility] , but no one home to take care of her .    consult , as she seems clinically stable for d/c

## 2022-01-03 NOTE — PROGRESS NOTE ADULT - ASSESSMENT
89 y/o Nigerien speaking female with PMHx of A. fib, HTN, GERD, BIB by grandson for FTT at home. PT daughter/caretaker was admitted to hospital a few days ago and since admission, pt has refused to eat and drink at home. Last meal was this morning and she ate some oatmeal. Pt is otherwise unable to care for herself. Grandson brought pt in and states he cannot care for her at home either. 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 Pt refuses to answer most questions when she is spoken to. Has been bed bound the past week.    #UTI  - on admission, leukocytosis, no fever, asymptomatic  - UA+  - c/w ceftriaxone 1g q24h  - pending UCx    #FTT :   - decrease oral intake / worsening dementia, currently AOx1 (bed-bound, not following commands, non-redirectable), possibly  2/2 UTI  - supportive care   - s/p IVF bolus  - will need placement as no one at home can take care of her    #COVID 19  - asymptomatic, without hypoxia   - will hold off on RDV/ dexa   - c/w lovenox 40 subq qd    #HTN  - SBP 100s today, need strict bp regimen  - Reduce metoprolol tartrate 50mg BID in setting of low BP  - c/w amlodipine 5mg qd    # chronic afib   - not on any AC : likely 2/2 dementia and fall risk   - c/w amlodipine and metorpolol as above  - rate is controlled     #GERD  - c/w home meds    dispo: called and left message to daughter. wanted to d/w her advance directive. pt. is stable to be d/c to rehab or home with home care  89 y/o Thai speaking female with PMHx of A. fib, HTN, GERD, BIB by grandson for FTT at home. PT daughter/caretaker was admitted to hospital a few days ago and since admission, pt has refused to eat and drink at home. Last meal was this morning and she ate some oatmeal. Pt is otherwise unable to care for herself. Grandson brought pt in and states he cannot care for her at home either. 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 Pt refuses to answer most questions when she is spoken to. Has been bed bound the past week.    #UTI  - on admission, leukocytosis, no fever, asymptomatic  - UA+  - s/p ceftriaxone 1g q24h  - UCx: negative    #FTT :   - decrease oral intake / worsening dementia, currently AOx1 (bed-bound, not following commands, non-redirectable), possibly  2/2 UTI  - supportive care   - s/p IVF bolus  - SW spoke with daughter who is hospitalized - she wants patient to return home after they both are discharged from hospital    #COVID 19  - asymptomatic, without hypoxia   - will hold off on RDV/ dexa   - c/w lovenox 40 subq qd    #HTN  - SBP 100s today, need strict bp regimen  - c/w metoprolol tartrate 50mg BID in setting of low BP  - c/w amlodipine 5mg qd    # chronic afib   - not on any AC : likely 2/2 dementia and fall risk   - c/w amlodipine and metoprolol as above  - rate is controlled     #GERD  - c/w home meds    dispo: called and left message to daughter. wanted to d/w her advance directive. pt. is stable to be d/c to rehab or home with home care  CM/SW: spoke to daughter (who is hospitalized in 7T), she wants patient to return home, refusing [facility]. Difficult placement at this time. Pending repeat COVID test. 89 y/o Ukrainian speaking female with PMHx of A. fib, HTN, GERD, BIB by grandson for FTT at home. PT daughter/caretaker was admitted to hospital a few days ago and since admission, pt has refused to eat and drink at home. Last meal was this morning and she ate some oatmeal. Pt is otherwise unable to care for herself. Grandson brought pt in and states he cannot care for her at home either. 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 Pt refuses to answer most questions when she is spoken to. Has been bed bound the past week.    #UTI  - on admission, leukocytosis, no fever, asymptomatic  - UA+  - s/p ceftriaxone 1g q24h  - UCx: negative    #FTT :   - decrease oral intake / worsening dementia, currently AOx1 (bed-bound, not following commands, non-redirectable), possibly  2/2 UTI  - supportive care   - s/p IVF bolus  - SW spoke with daughter who is hospitalized - she wants patient to return home after they both are discharged from hospital    #COVID 19  - asymptomatic, without hypoxia   - will hold off on RDV/ dexa   - c/w lovenox 40 subq qd    #HTN  - SBP 100s today, need strict bp regimen  - c/w metoprolol tartrate 50mg BID in setting of low BP  - c/w amlodipine 5mg qd    # chronic afib   - not on any AC : likely 2/2 dementia and fall risk   - c/w amlodipine and metoprolol as above  - rate is controlled     #GERD  - c/w home meds    dispo: called and left message to daughter. wanted to d/w her advance directive. pt. is stable to be d/c to rehab or home with home care  CM/SW: spoke to daughter (who is hospitalized in 7T), she wants patient to return home, refusing [facility]. Difficult placement at this time. Pending repeat COVID test.  Spoke to : Family is getting CNA 24/7 until wife is healthy. Plan is for CNA to start on Thursday. Will follow-up later this week.

## 2022-01-03 NOTE — PROGRESS NOTE ADULT - SUBJECTIVE AND OBJECTIVE BOX
Patient:  GOLDEN YOUNG  [id number]    Progress Note    Interval events: No acute events.  Pertinent ROS (if any):      Administered:  pantoprazole    Tablet: 40 milliGRAM(s) Oral (01-03 @ 06:40)  metoprolol tartrate: 50 milliGRAM(s) Oral (01-03 @ 05:37)  amLODIPine   Tablet: 5 milliGRAM(s) Oral (01-03 @ 05:37)  cefTRIAXone   IVPB: 100 mL/Hr IV Intermittent (01-02 @ 23:48)        OBJECTIVE:    01-02 @ 07:01  -  01-03 @ 07:00  --------------------------------------------------------  IN: 225 mL / OUT: 0 mL / NET: 225 mL      CAPILLARY BLOOD GLUCOSE            VITALS:  T(F): 97.7 (01-03-22 @ 04:07), Max: 98.7 (01-02-22 @ 21:18)  HR: 81 (01-03-22 @ 04:07) (70 - 99)  BP: 107/60 (01-03-22 @ 04:07) (107/60 - 166/89)  BP(mean): --  ABP: --  ABP(mean): --  RR: 18 (01-03-22 @ 04:07) (16 - 18)  SpO2: 96% (01-03-22 @ 04:07) (96% - 100%)    PHYSICAL EXAM:  GENERAL: NAD, lying in bed comfortably  HEAD:  Atraumatic, Normocephalic  EYES: EOMI, PERRLA, conjunctiva and sclera clear  ENT: Moist mucous membranes  NECK: Supple, No JVD  CHEST/LUNG: Clear to auscultation bilaterally; No rales, rhonchi, wheezing, or rubs. Unlabored respirations  HEART: Regular rate and rhythm; No murmurs, rubs, or gallops  ABDOMEN: Bowel sounds present; Soft, Nontender, Nondistended. No hepatomegaly  EXTREMITIES:  2+ Peripheral Pulses, brisk capillary refill. No clubbing, cyanosis, or edema  NERVOUS SYSTEM:  Alert & Oriented X3, speech clear. No deficits   MSK: FROM all 4 extremities, full and equal strength  SKIN: No rashes or lesions    HOSPITAL MEDICATIONS:  Standing Meds:  amLODIPine   Tablet 5 milliGRAM(s) Oral daily  cefTRIAXone   IVPB 1000 milliGRAM(s) IV Intermittent every 24 hours  enoxaparin Injectable 40 milliGRAM(s) SubCutaneous daily  metoprolol tartrate 50 milliGRAM(s) Oral two times a day  pantoprazole    Tablet 40 milliGRAM(s) Oral before breakfast      PRN Meds:  acetaminophen     Tablet .. 650 milliGRAM(s) Oral every 6 hours PRN      LABS:  CBC 01-02-22 @ 06:59                        9.5    10.24 )-----------( 301                   29.9       Hgb trend: 9.5 <-- , 9.7 <-- , 11.9 <--   WBC trend: 10.24 <-- , 12.38 <-- , 15.54 <--       CMP 01-02-22 @ 06:56    135  |  101  |  20  ----------------------------<  98  3.6   |  20<L>  |  0.98    Phos  3.4     01-02  Mg     1.9     01-02        Serum Cr trend: 0.98 <-- , 0.90 <-- , 0.97 <-- , 0.72 <-- , 1.00 <--         ABG Trend:     VBG Trend:       MICROBIOLOGY:     Culture - Urine (collected 31 Dec 2021 14:47)  Source: Catheterized Catheterized  Final Report (01 Jan 2022 13:41):    <10,000 CFU/mL Normal Urogenital Lainey        RADIOLOGY:  [ ] Reviewed and interpreted by me    EKG:   Patient:  GOLDEN YOUNG  [id number]    Progress Note    Interval events: No acute events. Patient is AOx1, not following commands. Answering questions with yes/no. Unchanged from previous exams. Ongoing SW discussion as daughter currently hospitalized and would like patient to return home.  Pertinent ROS (if any):   Denies /f/c/n/v/cp/ap/bowel or bladder changes.     Administered:  pantoprazole    Tablet: 40 milliGRAM(s) Oral (01-03 @ 06:40)  metoprolol tartrate: 50 milliGRAM(s) Oral (01-03 @ 05:37)  amLODIPine   Tablet: 5 milliGRAM(s) Oral (01-03 @ 05:37)  cefTRIAXone   IVPB: 100 mL/Hr IV Intermittent (01-02 @ 23:48)      OBJECTIVE:    01-02 @ 07:01  -  01-03 @ 07:00  --------------------------------------------------------  IN: 225 mL / OUT: 0 mL / NET: 225 mL      CAPILLARY BLOOD GLUCOSE      VITALS:  T(F): 97.7 (01-03-22 @ 04:07), Max: 98.7 (01-02-22 @ 21:18)  HR: 81 (01-03-22 @ 04:07) (70 - 99)  BP: 107/60 (01-03-22 @ 04:07) (107/60 - 166/89)  BP(mean): --  ABP: --  ABP(mean): --  RR: 18 (01-03-22 @ 04:07) (16 - 18)  SpO2: 96% (01-03-22 @ 04:07) (96% - 100%)    PHYSICAL EXAM:  GENERAL: NAD, well-groomed, well-developed  HEAD:  Atraumatic, Normocephalic  EYES: EOMI, PERRLA, conjunctiva and sclera clear  ENMT: No tonsillar erythema, exudates, or enlargement; Moist mucous membranes, Good dentition, No lesions  NECK: Supple, No JVD, Normal thyroid  NERVOUS SYSTEM:  Alert & Oriented X3, Good concentration; Motor Strength 5/5 B/L upper and lower extremities; DTRs 2+ intact and symmetric  CHEST/LUNG: Clear to percussion bilaterally; No rales, rhonchi, wheezing, or rubs  HEART: Regular rate and rhythm; No murmurs, rubs, or gallops  ABDOMEN: Soft, Nontender, Nondistended; Bowel sounds present  EXTREMITIES:  2+ Peripheral Pulses, No clubbing, cyanosis, or edema  LYMPH: No lymphadenopathy noted  SKIN: No rashes or lesions    HOSPITAL MEDICATIONS:  Standing Meds:  amLODIPine   Tablet 5 milliGRAM(s) Oral daily  cefTRIAXone   IVPB 1000 milliGRAM(s) IV Intermittent every 24 hours  enoxaparin Injectable 40 milliGRAM(s) SubCutaneous daily  metoprolol tartrate 50 milliGRAM(s) Oral two times a day  pantoprazole    Tablet 40 milliGRAM(s) Oral before breakfast      PRN Meds:  acetaminophen     Tablet .. 650 milliGRAM(s) Oral every 6 hours PRN      LABS:  CBC 01-02-22 @ 06:59                        9.5    10.24 )-----------( 301                   29.9       Hgb trend: 9.5 <-- , 9.7 <-- , 11.9 <--   WBC trend: 10.24 <-- , 12.38 <-- , 15.54 <--       CMP 01-02-22 @ 06:56    135  |  101  |  20  ----------------------------<  98  3.6   |  20<L>  |  0.98    Phos  3.4     01-02  Mg     1.9     01-02        Serum Cr trend: 0.98 <-- , 0.90 <-- , 0.97 <-- , 0.72 <-- , 1.00 <--         ABG Trend:     VBG Trend:       MICROBIOLOGY:     Culture - Urine (collected 31 Dec 2021 14:47)  Source: Catheterized Catheterized  Final Report (01 Jan 2022 13:41):    <10,000 CFU/mL Normal Urogenital Lainey        RADIOLOGY:  [ ] Reviewed and interpreted by me    EKG:

## 2022-01-03 NOTE — PROGRESS NOTE ADULT - SUBJECTIVE AND OBJECTIVE BOX
Patient is a 89y old  Female who presents with a chief complaint of 6adult FTT / covid pos / dementia (03 Jan 2022 15:19)      INTERVAL HPI/OVERNIGHT EVENTS:  T(C): 36.4 (01-03-22 @ 21:48), Max: 37 (01-03-22 @ 14:56)  HR: 87 (01-03-22 @ 21:48) (81 - 92)  BP: 123/74 (01-03-22 @ 21:48) (107/60 - 126/65)  RR: 18 (01-03-22 @ 21:48) (18 - 18)  SpO2: 94% (01-03-22 @ 21:48) (94% - 96%)  Wt(kg): --  I&O's Summary    02 Jan 2022 07:01  -  03 Jan 2022 07:00  --------------------------------------------------------  IN: 225 mL / OUT: 0 mL / NET: 225 mL    03 Jan 2022 07:01  -  03 Jan 2022 23:00  --------------------------------------------------------  IN: 200 mL / OUT: 500 mL / NET: -300 mL        PAST MEDICAL & SURGICAL HISTORY:  Acute Coronary Syndrome    Atrial Fibrillation    Hypertension    Renal Calculi    History of Colon Cancer    S/P Cardiac Pacemaker Procedure    Tachy-Francis Syndrome    Chronic Constipation    H/O gastroesophageal reflux (GERD)    History of Appendectomy    S/P LAVERNE (Total Abdominal Hysterectomy)    History of Colon Resection    Open fracture of right hip  ORIF of Rt Hip (2011)    Pacemaker        SOCIAL HISTORY  Alcohol:  Tobacco:  Illicit substance use:    FAMILY HISTORY:    REVIEW OF SYSTEMS:  CONSTITUTIONAL: No fever, weight loss, or fatigue  EYES: No eye pain, visual disturbances, or discharge  ENMT:  No difficulty hearing, tinnitus, vertigo; No sinus or throat pain  NECK: No pain or stiffness  RESPIRATORY: No cough, wheezing, chills or hemoptysis; No shortness of breath  CARDIOVASCULAR: No chest pain, palpitations, dizziness, or leg swelling  GASTROINTESTINAL: No abdominal or epigastric pain. No nausea, vomiting, or hematemesis; No diarrhea or constipation. No melena or hematochezia.  GENITOURINARY: No dysuria, frequency, hematuria, or incontinence  NEUROLOGICAL: No headaches, memory loss, loss of strength, numbness, or tremors  SKIN: No itching, burning, rashes, or lesions   LYMPH NODES: No enlarged glands  ENDOCRINE: No heat or cold intolerance; No hair loss  MUSCULOSKELETAL: No joint pain or swelling; No muscle, back, or extremity pain  PSYCHIATRIC: No depression, anxiety, mood swings, or difficulty sleeping  HEME/LYMPH: No easy bruising, or bleeding gums  ALLERY AND IMMUNOLOGIC: No hives or eczema    RADIOLOGY & ADDITIONAL TESTS:    Imaging Personally Reviewed:  [ ] YES  [ ] NO    Consultant(s) Notes Reviewed:  [ ] YES  [ ] NO    PHYSICAL EXAM:  GENERAL: NAD, well-groomed, well-developed  HEAD:  Atraumatic, Normocephalic  EYES: EOMI, PERRLA, conjunctiva and sclera clear  ENMT: No tonsillar erythema, exudates, or enlargement; Moist mucous membranes, Good dentition, No lesions  NECK: Supple, No JVD, Normal thyroid  NERVOUS SYSTEM:  Alert & Oriented X3, Good concentration; Motor Strength 5/5 B/L upper and lower extremities; DTRs 2+ intact and symmetric  CHEST/LUNG: Clear to percussion bilaterally; No rales, rhonchi, wheezing, or rubs  HEART: Regular rate and rhythm; No murmurs, rubs, or gallops  ABDOMEN: Soft, Nontender, Nondistended; Bowel sounds present  EXTREMITIES:  2+ Peripheral Pulses, No clubbing, cyanosis, or edema  LYMPH: No lymphadenopathy noted  SKIN: No rashes or lesions    LABS:                        10.5   10.79 )-----------( 293      ( 03 Jan 2022 07:59 )             32.8     01-03    135  |  101  |  18  ----------------------------<  111<H>  3.9   |  20<L>  |  0.77    Ca    9.1      03 Jan 2022 07:37  Phos  3.0     01-03  Mg     1.9     01-03          CAPILLARY BLOOD GLUCOSE                MEDICATIONS  (STANDING):  amLODIPine   Tablet 5 milliGRAM(s) Oral daily  enoxaparin Injectable 40 milliGRAM(s) SubCutaneous daily  metoprolol tartrate 50 milliGRAM(s) Oral two times a day  pantoprazole    Tablet 40 milliGRAM(s) Oral before breakfast    MEDICATIONS  (PRN):  acetaminophen     Tablet .. 650 milliGRAM(s) Oral every 6 hours PRN Temp greater or equal to 38C (100.4F), Moderate Pain (4 - 6)      Care Discussed with Consultants/Other Providers [ ] YES  [ ] NO Patient is a 89y old  Female who presents with a chief complaint of 6adult FTT / covid pos / dementia (03 Jan 2022 15:19)      INTERVAL HPI/OVERNIGHT EVENTS: baseline dementia , nonverbal , NAD     T(C): 36.4 (01-03-22 @ 21:48), Max: 37 (01-03-22 @ 14:56)  HR: 87 (01-03-22 @ 21:48) (81 - 92)  BP: 123/74 (01-03-22 @ 21:48) (107/60 - 126/65)  RR: 18 (01-03-22 @ 21:48) (18 - 18)  SpO2: 94% (01-03-22 @ 21:48) (94% - 96%)  Wt(kg): --  I&O's Summary    02 Jan 2022 07:01  -  03 Jan 2022 07:00  --------------------------------------------------------  IN: 225 mL / OUT: 0 mL / NET: 225 mL    03 Jan 2022 07:01  -  03 Jan 2022 23:00  --------------------------------------------------------  IN: 200 mL / OUT: 500 mL / NET: -300 mL        PAST MEDICAL & SURGICAL HISTORY:  Acute Coronary Syndrome    Atrial Fibrillation    Hypertension    Renal Calculi    History of Colon Cancer    S/P Cardiac Pacemaker Procedure    Tachy-Francis Syndrome    Chronic Constipation    H/O gastroesophageal reflux (GERD)    History of Appendectomy    S/P LAVERNE (Total Abdominal Hysterectomy)    History of Colon Resection    Open fracture of right hip  ORIF of Rt Hip (2011)    Pacemaker        SOCIAL HISTORY  Alcohol:  Tobacco:  Illicit substance use:    FAMILY HISTORY:    REVIEW OF SYSTEMS:  CONSTITUTIONAL: No fever, weight loss, or fatigue  EYES: No eye pain, visual disturbances, or discharge  ENMT:  No difficulty hearing, tinnitus, vertigo; No sinus or throat pain  NECK: No pain or stiffness  RESPIRATORY: No cough, wheezing, chills or hemoptysis; No shortness of breath  CARDIOVASCULAR: No chest pain, palpitations, dizziness, or leg swelling  GASTROINTESTINAL: No abdominal or epigastric pain. No nausea, vomiting, or hematemesis; No diarrhea or constipation. No melena or hematochezia.  GENITOURINARY: No dysuria, frequency, hematuria, or incontinence  NEUROLOGICAL: No headaches, memory loss, loss of strength, numbness, or tremors  SKIN: No itching, burning, rashes, or lesions   LYMPH NODES: No enlarged glands  ENDOCRINE: No heat or cold intolerance; No hair loss  MUSCULOSKELETAL: No joint pain or swelling; No muscle, back, or extremity pain  PSYCHIATRIC: No depression, anxiety, mood swings, or difficulty sleeping  HEME/LYMPH: No easy bruising, or bleeding gums  ALLERY AND IMMUNOLOGIC: No hives or eczema    RADIOLOGY & ADDITIONAL TESTS:    Imaging Personally Reviewed:  [ ] YES  [ ] NO    Consultant(s) Notes Reviewed:  [ ] YES  [ ] NO    PHYSICAL EXAM:  GENERAL: NAD, well-groomed, well-developed  HEAD:  Atraumatic, Normocephalic  EYES: EOMI, PERRLA, conjunctiva and sclera clear  ENMT: No tonsillar erythema, exudates, or enlargement; Moist mucous membranes, Good dentition, No lesions  NECK: Supple, No JVD, Normal thyroid  NERVOUS SYSTEM:  Alert & Oriented X3, Good concentration; Motor Strength 5/5 B/L upper and lower extremities; DTRs 2+ intact and symmetric  CHEST/LUNG: Clear to percussion bilaterally; No rales, rhonchi, wheezing, or rubs  HEART: Regular rate and rhythm; No murmurs, rubs, or gallops  ABDOMEN: Soft, Nontender, Nondistended; Bowel sounds present  EXTREMITIES:  2+ Peripheral Pulses, No clubbing, cyanosis, or edema  LYMPH: No lymphadenopathy noted  SKIN: No rashes or lesions    LABS:                        10.5   10.79 )-----------( 293      ( 03 Jan 2022 07:59 )             32.8     01-03    135  |  101  |  18  ----------------------------<  111<H>  3.9   |  20<L>  |  0.77    Ca    9.1      03 Jan 2022 07:37  Phos  3.0     01-03  Mg     1.9     01-03          CAPILLARY BLOOD GLUCOSE                MEDICATIONS  (STANDING):  amLODIPine   Tablet 5 milliGRAM(s) Oral daily  enoxaparin Injectable 40 milliGRAM(s) SubCutaneous daily  metoprolol tartrate 50 milliGRAM(s) Oral two times a day  pantoprazole    Tablet 40 milliGRAM(s) Oral before breakfast    MEDICATIONS  (PRN):  acetaminophen     Tablet .. 650 milliGRAM(s) Oral every 6 hours PRN Temp greater or equal to 38C (100.4F), Moderate Pain (4 - 6)      Care Discussed with Consultants/Other Providers [ ] YES  [ ] NO

## 2022-01-04 NOTE — PROGRESS NOTE ADULT - ASSESSMENT
89 y/o Turkish speaking female with PMHx of A. fib, HTN, GERD, BIB by grandson for FTT at home. PT daughter/caretaker was admitted to hospital a few days ago and since admission, pt has refused to eat and drink at home. Last meal was this morning and she ate some oatmeal. Pt is otherwise unable to care for herself. Grandson brought pt in and states he cannot care for her at home either. 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 Pt refuses to answer most questions when she is spoken to. Has been bed bound the past week.    #UTI  - on admission, leukocytosis, no fever, asymptomatic  urine c/s neg   off abx     #FTT :   - decrease oral intake / worsening dementia, currently AOx1 (bed-bound, not following commands, non-redirectable), possibly  2/2 UTI  - supportive care   - s/p IVF bolus      #COVID 19  - asymptomatic, without hypoxia   - will hold off on RDV/ dexa   - c/w lovenox 40 subq qd    #HTN  - SBP 100s today, need strict bp regimen  - c/w metoprolol tartrate 50mg BID in setting of low BP  - c/w amlodipine 5mg qd    # chronic afib   - not on any AC : likely 2/2 dementia and fall risk   - c/w amlodipine and metoprolol as above  - rate is controlled     #GERD  - c/w home meds    dispo: clinically ok , daughter don't want her to go to [facility] , but no one home to take care of her .    consult , as she seems clinically stable for d/c.  Son-in-law is setting up home private hire NA. Said it will be complete by 1/6.

## 2022-01-04 NOTE — PROGRESS NOTE ADULT - SUBJECTIVE AND OBJECTIVE BOX
Patient:  GOLDEN YOUNG  [id number]    Progress Note    Interval events: No acute events. Patient unchanged from yesterday. She is answering questions w/ one-two words. Denies 10 system ROS.  Pertinent ROS (if any):  Denies f/c/n/v/cp/ap/bowel or bladder changes.    Administered:  pantoprazole    Tablet: 40 milliGRAM(s) Oral (01-04 @ 06:30)  metoprolol tartrate: 50 milliGRAM(s) Oral (01-04 @ 06:30)  amLODIPine   Tablet: 5 milliGRAM(s) Oral (01-04 @ 06:30)        OBJECTIVE:    01-03 @ 07:01  -  01-04 @ 07:00  --------------------------------------------------------  IN: 200 mL / OUT: 500 mL / NET: -300 mL      CAPILLARY BLOOD GLUCOSE    VITALS:  T(F): 98.3 (01-04-22 @ 06:07), Max: 98.6 (01-03-22 @ 14:56)  HR: 91 (01-04-22 @ 06:07) (87 - 92)  BP: 99/69 (01-04-22 @ 06:07) (99/69 - 126/65)  BP(mean): --  ABP: --  ABP(mean): --  RR: 18 (01-04-22 @ 06:07) (18 - 18)  SpO2: 96% (01-04-22 @ 06:07) (94% - 96%)    PHYSICAL EXAM:  GENERAL: NAD, well-groomed, well-developed  HEAD:  Atraumatic, Normocephalic  EYES: EOMI, PERRLA, conjunctiva and sclera clear  ENMT: No tonsillar erythema, exudates, or enlargement; Moist mucous membranes, Good dentition, No lesions  NECK: Supple, No JVD, Normal thyroid  NERVOUS SYSTEM:  Alert & Oriented X3, Good concentration; Motor Strength 5/5 B/L upper and lower extremities; DTRs 2+ intact and symmetric  CHEST/LUNG: Clear to percussion bilaterally; No rales, rhonchi, wheezing, or rubs  HEART: Regular rate and rhythm; No murmurs, rubs, or gallops  ABDOMEN: Soft, Nontender, Nondistended; Bowel sounds present  EXTREMITIES:  2+ Peripheral Pulses, No clubbing, cyanosis, or edema  LYMPH: No lymphadenopathy noted  SKIN: No rashes or lesions    HOSPITAL MEDICATIONS:  Standing Meds:  amLODIPine   Tablet 5 milliGRAM(s) Oral daily  enoxaparin Injectable 40 milliGRAM(s) SubCutaneous daily  metoprolol tartrate 50 milliGRAM(s) Oral two times a day  pantoprazole    Tablet 40 milliGRAM(s) Oral before breakfast    PRN Meds:  acetaminophen     Tablet .. 650 milliGRAM(s) Oral every 6 hours PRN    LABS:  CBC 01-04-22 @ 07:08                        10.0   9.12  )-----------( 321                   32.2       Hgb trend: 10.0 <-- , 10.5 <-- , 9.5 <--   WBC trend: 9.12 <-- , 10.79 <-- , 10.24 <--       CMP 01-03-22 @ 07:37    135  |  101  |  18  ----------------------------<  111<H>  3.9   |  20<L>  |  0.77    Phos  3.0     01-03  Mg     1.9     01-03        Serum Cr trend: 0.77 <-- , 0.98 <--         ABG Trend:     VBG Trend:       MICROBIOLOGY:       RADIOLOGY:  [ ] Reviewed and interpreted by me    EKG:

## 2022-01-04 NOTE — PROGRESS NOTE ADULT - SUBJECTIVE AND OBJECTIVE BOX
Patient is a 89y old  Female who presents with a chief complaint of FTT / covid pos / dementia (04 Jan 2022 07:40)      INTERVAL HPI/OVERNIGHT EVENTS:  T(C): 36.7 (01-04-22 @ 21:32), Max: 36.8 (01-04-22 @ 06:07)  HR: 91 (01-04-22 @ 21:32) (66 - 91)  BP: 123/71 (01-04-22 @ 21:32) (99/69 - 123/71)  RR: 18 (01-04-22 @ 21:32) (18 - 18)  SpO2: 98% (01-04-22 @ 21:32) (96% - 98%)  Wt(kg): --  I&O's Summary    03 Jan 2022 07:01  -  04 Jan 2022 07:00  --------------------------------------------------------  IN: 200 mL / OUT: 500 mL / NET: -300 mL    04 Jan 2022 07:01  -  04 Jan 2022 23:18  --------------------------------------------------------  IN: 150 mL / OUT: 0 mL / NET: 150 mL        PAST MEDICAL & SURGICAL HISTORY:  Acute Coronary Syndrome    Atrial Fibrillation    Hypertension    Renal Calculi    History of Colon Cancer    S/P Cardiac Pacemaker Procedure    Tachy-Francis Syndrome    Chronic Constipation    H/O gastroesophageal reflux (GERD)    History of Appendectomy    S/P LAVERNE (Total Abdominal Hysterectomy)    History of Colon Resection    Open fracture of right hip  ORIF of Rt Hip (2011)    Pacemaker        SOCIAL HISTORY  Alcohol:  Tobacco:  Illicit substance use:    FAMILY HISTORY:    REVIEW OF SYSTEMS:  CONSTITUTIONAL: No fever, weight loss, or fatigue  EYES: No eye pain, visual disturbances, or discharge  ENMT:  No difficulty hearing, tinnitus, vertigo; No sinus or throat pain  NECK: No pain or stiffness  RESPIRATORY: No cough, wheezing, chills or hemoptysis; No shortness of breath  CARDIOVASCULAR: No chest pain, palpitations, dizziness, or leg swelling  GASTROINTESTINAL: No abdominal or epigastric pain. No nausea, vomiting, or hematemesis; No diarrhea or constipation. No melena or hematochezia.  GENITOURINARY: No dysuria, frequency, hematuria, or incontinence  NEUROLOGICAL: No headaches, memory loss, loss of strength, numbness, or tremors  SKIN: No itching, burning, rashes, or lesions   LYMPH NODES: No enlarged glands  ENDOCRINE: No heat or cold intolerance; No hair loss  MUSCULOSKELETAL: No joint pain or swelling; No muscle, back, or extremity pain  PSYCHIATRIC: No depression, anxiety, mood swings, or difficulty sleeping  HEME/LYMPH: No easy bruising, or bleeding gums  ALLERY AND IMMUNOLOGIC: No hives or eczema    RADIOLOGY & ADDITIONAL TESTS:    Imaging Personally Reviewed:  [ ] YES  [ ] NO    Consultant(s) Notes Reviewed:  [ ] YES  [ ] NO    PHYSICAL EXAM:  GENERAL: NAD, well-groomed, well-developed  HEAD:  Atraumatic, Normocephalic  EYES: EOMI, PERRLA, conjunctiva and sclera clear  ENMT: No tonsillar erythema, exudates, or enlargement; Moist mucous membranes, Good dentition, No lesions  NECK: Supple, No JVD, Normal thyroid  NERVOUS SYSTEM:  Alert & Oriented X3, Good concentration; Motor Strength 5/5 B/L upper and lower extremities; DTRs 2+ intact and symmetric  CHEST/LUNG: Clear to percussion bilaterally; No rales, rhonchi, wheezing, or rubs  HEART: Regular rate and rhythm; No murmurs, rubs, or gallops  ABDOMEN: Soft, Nontender, Nondistended; Bowel sounds present  EXTREMITIES:  2+ Peripheral Pulses, No clubbing, cyanosis, or edema  LYMPH: No lymphadenopathy noted  SKIN: No rashes or lesions    LABS:                        10.0   9.12  )-----------( 321      ( 04 Jan 2022 07:08 )             32.2     01-04    139  |  103  |  24<H>  ----------------------------<  103<H>  3.7   |  23  |  0.91    Ca    8.9      04 Jan 2022 07:07  Phos  3.1     01-04  Mg     1.9     01-04          CAPILLARY BLOOD GLUCOSE                MEDICATIONS  (STANDING):  amLODIPine   Tablet 5 milliGRAM(s) Oral daily  enoxaparin Injectable 40 milliGRAM(s) SubCutaneous daily  metoprolol tartrate 50 milliGRAM(s) Oral two times a day  pantoprazole    Tablet 40 milliGRAM(s) Oral before breakfast    MEDICATIONS  (PRN):  acetaminophen     Tablet .. 650 milliGRAM(s) Oral every 6 hours PRN Temp greater or equal to 38C (100.4F), Moderate Pain (4 - 6)      Care Discussed with Consultants/Other Providers [ ] YES  [ ] NO

## 2022-01-05 NOTE — PROGRESS NOTE ADULT - SUBJECTIVE AND OBJECTIVE BOX
Patient:  GOLDEN YOUNG  [id number]    Progress Note    Interval events: No acute events. Patient resting comfortably in bed. No agitation/alteration.  Pertinent ROS (if any):  Denies f/c/n/v/cp/ap/bowel or bladder changes.    Administered:  metoprolol tartrate: 50 milliGRAM(s) Oral (01-05 @ 06:34)  amLODIPine   Tablet: 5 milliGRAM(s) Oral (01-05 @ 06:34)        OBJECTIVE:    01-04 @ 07:01  -  01-05 @ 07:00  --------------------------------------------------------  IN: 150 mL / OUT: 100 mL / NET: 50 mL      CAPILLARY BLOOD GLUCOSE            VITALS:  T(F): 98.2 (01-05-22 @ 04:46), Max: 98.2 (01-05-22 @ 04:46)  HR: 102 (01-05-22 @ 04:46) (66 - 102)  BP: 118/66 (01-05-22 @ 04:46) (118/66 - 123/71)  BP(mean): --  ABP: --  ABP(mean): --  RR: 18 (01-05-22 @ 04:46) (18 - 18)  SpO2: 98% (01-05-22 @ 04:46) (96% - 98%)    PHYSICAL EXAM:  GENERAL: NAD, well-groomed, well-developed  HEAD:  Atraumatic, Normocephalic  EYES: EOMI, PERRLA, conjunctiva and sclera clear  ENMT: No tonsillar erythema, exudates, or enlargement; Moist mucous membranes, Good dentition, No lesions  NECK: Supple, No JVD, Normal thyroid  NERVOUS SYSTEM:  Alert & Oriented X3, Good concentration; Motor Strength 5/5 B/L upper and lower extremities; DTRs 2+ intact and symmetric  CHEST/LUNG: Clear to percussion bilaterally; No rales, rhonchi, wheezing, or rubs  HEART: Regular rate and rhythm; No murmurs, rubs, or gallops  ABDOMEN: Soft, Nontender, Nondistended; Bowel sounds present  EXTREMITIES:  2+ Peripheral Pulses, No clubbing, cyanosis, or edema  LYMPH: No lymphadenopathy noted  SKIN: No rashes or lesions    HOSPITAL MEDICATIONS:  Standing Meds:  amLODIPine   Tablet 5 milliGRAM(s) Oral daily  enoxaparin Injectable 40 milliGRAM(s) SubCutaneous daily  metoprolol tartrate 50 milliGRAM(s) Oral two times a day  pantoprazole    Tablet 40 milliGRAM(s) Oral before breakfast      PRN Meds:  acetaminophen     Tablet .. 650 milliGRAM(s) Oral every 6 hours PRN      LABS:  CBC 01-05-22 @ 06:45                        9.8    9.36  )-----------( 325                   31.3       Hgb trend: 9.8 <-- , 10.0 <-- , 10.5 <--   WBC trend: 9.36 <-- , 9.12 <-- , 10.79 <--       CMP 01-04-22 @ 07:07    139  |  103  |  24<H>  ----------------------------<  103<H>  3.7   |  23  |  0.91    Phos  3.1     01-04  Mg     1.9     01-04        Serum Cr trend: 0.91 <-- , 0.77 <--         ABG Trend:     VBG Trend:       MICROBIOLOGY:       RADIOLOGY:  [ ] Reviewed and interpreted by me    EKG:

## 2022-01-05 NOTE — PROGRESS NOTE ADULT - REASON FOR ADMISSION
6adult FTT / covid pos / dementia
FTT / covid pos / dementia
6adult FTT / covid pos / dementia

## 2022-01-05 NOTE — PROGRESS NOTE ADULT - SUBJECTIVE AND OBJECTIVE BOX
Patient is a 89y old  Female who presents with a chief complaint of 6adult FTT / covid pos / dementia (05 Jan 2022 07:14)      INTERVAL HPI/OVERNIGHT EVENTS: doing fair , on RA   T(C): 37 (01-05-22 @ 21:41), Max: 37 (01-05-22 @ 21:41)  HR: 88 (01-05-22 @ 21:41) (69 - 102)  BP: 130/88 (01-05-22 @ 21:41) (118/66 - 134/81)  RR: 18 (01-05-22 @ 21:41) (18 - 18)  SpO2: 97% (01-05-22 @ 21:41) (97% - 98%)  Wt(kg): --  I&O's Summary    04 Jan 2022 07:01  -  05 Jan 2022 07:00  --------------------------------------------------------  IN: 150 mL / OUT: 100 mL / NET: 50 mL    05 Jan 2022 07:01  -  05 Jan 2022 23:33  --------------------------------------------------------  IN: 275 mL / OUT: 50 mL / NET: 225 mL        PAST MEDICAL & SURGICAL HISTORY:  Acute Coronary Syndrome    Atrial Fibrillation    Hypertension    Renal Calculi    History of Colon Cancer    S/P Cardiac Pacemaker Procedure    Tachy-Francis Syndrome    Chronic Constipation    H/O gastroesophageal reflux (GERD)    History of Appendectomy    S/P LAVERNE (Total Abdominal Hysterectomy)    History of Colon Resection    Open fracture of right hip  ORIF of Rt Hip (2011)    Pacemaker        SOCIAL HISTORY  Alcohol:  Tobacco:  Illicit substance use:    FAMILY HISTORY:    REVIEW OF SYSTEMS:  CONSTITUTIONAL: No fever, weight loss, or fatigue  EYES: No eye pain, visual disturbances, or discharge  ENMT:  No difficulty hearing, tinnitus, vertigo; No sinus or throat pain  NECK: No pain or stiffness  RESPIRATORY: No cough, wheezing, chills or hemoptysis; No shortness of breath  CARDIOVASCULAR: No chest pain, palpitations, dizziness, or leg swelling  GASTROINTESTINAL: No abdominal or epigastric pain. No nausea, vomiting, or hematemesis; No diarrhea or constipation. No melena or hematochezia.  GENITOURINARY: No dysuria, frequency, hematuria, or incontinence  NEUROLOGICAL: No headaches, memory loss, loss of strength, numbness, or tremors  SKIN: No itching, burning, rashes, or lesions   LYMPH NODES: No enlarged glands  ENDOCRINE: No heat or cold intolerance; No hair loss  MUSCULOSKELETAL: No joint pain or swelling; No muscle, back, or extremity pain  PSYCHIATRIC: No depression, anxiety, mood swings, or difficulty sleeping  HEME/LYMPH: No easy bruising, or bleeding gums  ALLERY AND IMMUNOLOGIC: No hives or eczema    RADIOLOGY & ADDITIONAL TESTS:    Imaging Personally Reviewed:  [ ] YES  [ ] NO    Consultant(s) Notes Reviewed:  [ ] YES  [ ] NO    PHYSICAL EXAM:  GENERAL: NAD, well-groomed, well-developed  HEAD:  Atraumatic, Normocephalic  EYES: EOMI, PERRLA, conjunctiva and sclera clear  ENMT: No tonsillar erythema, exudates, or enlargement; Moist mucous membranes, Good dentition, No lesions  NECK: Supple, No JVD, Normal thyroid  NERVOUS SYSTEM:  Alert & Oriented X3, Good concentration; Motor Strength 5/5 B/L upper and lower extremities; DTRs 2+ intact and symmetric  CHEST/LUNG: Clear to percussion bilaterally; No rales, rhonchi, wheezing, or rubs  HEART: Regular rate and rhythm; No murmurs, rubs, or gallops  ABDOMEN: Soft, Nontender, Nondistended; Bowel sounds present  EXTREMITIES:  2+ Peripheral Pulses, No clubbing, cyanosis, or edema  LYMPH: No lymphadenopathy noted  SKIN: No rashes or lesions    LABS:                        9.8    9.36  )-----------( 325      ( 05 Jan 2022 06:45 )             31.3     01-05    138  |  101  |  24<H>  ----------------------------<  105<H>  3.3<L>   |  22  |  0.85    Ca    9.3      05 Jan 2022 06:43  Phos  3.1     01-05  Mg     2.0     01-05          CAPILLARY BLOOD GLUCOSE                MEDICATIONS  (STANDING):  amLODIPine   Tablet 5 milliGRAM(s) Oral daily  enoxaparin Injectable 40 milliGRAM(s) SubCutaneous daily  metoprolol tartrate 50 milliGRAM(s) Oral two times a day  pantoprazole    Tablet 40 milliGRAM(s) Oral before breakfast    MEDICATIONS  (PRN):  acetaminophen     Tablet .. 650 milliGRAM(s) Oral every 6 hours PRN Temp greater or equal to 38C (100.4F), Moderate Pain (4 - 6)      Care Discussed with Consultants/Other Providers [ ] YES  [ ] NO

## 2022-01-05 NOTE — PROGRESS NOTE ADULT - ASSESSMENT
89 y/o Chinese speaking female with PMHx of A. fib, HTN, GERD, BIB by grandson for FTT at home. PT daughter/caretaker was admitted to hospital a few days ago and since admission, pt has refused to eat and drink at home. Last meal was this morning and she ate some oatmeal. Pt is otherwise unable to care for herself. Grandson brought pt in and states he cannot care for her at home either. 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 Pt refuses to answer most questions when she is spoken to. Has been bed bound the past week.    #UTI  - on admission, leukocytosis, no fever, asymptomatic  urine c/s neg   off abx     #FTT :   - decrease oral intake / worsening dementia, currently AOx1 (bed-bound, not following commands, non-redirectable), possibly  2/2 UTI  - supportive care   - s/p IVF bolus      #COVID 19  - asymptomatic, without hypoxia   - will hold off on RDV/ dexa   - c/w lovenox 40 subq qd    #HTN  - SBP 100s today, need strict bp regimen  - c/w metoprolol tartrate 50mg BID in setting of low BP  - c/w amlodipine 5mg qd    # chronic afib   - not on any AC : likely 2/2 dementia and fall risk   - c/w amlodipine and metoprolol as above  - rate is controlled     #GERD  - c/w home meds    dispo: clinically ok , daughter don't want her to go to [facility] , but no one home to take care of her .    consult , as she seems clinically stable for d/c.  Son-in-law is setting up home private hire NA. Said it will be complete by 1/6.

## 2022-01-05 NOTE — PROGRESS NOTE ADULT - ASSESSMENT
89 y/o German speaking female with PMHx of A. fib, HTN, GERD, BIB by grandson for FTT at home. PT daughter/caretaker was admitted to hospital a few days ago and since admission, pt has refused to eat and drink at home. Last meal was this morning and she ate some oatmeal. Pt is otherwise unable to care for herself. Grandson brought pt in and states he cannot care for her at home either. 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 Pt refuses to answer most questions when she is spoken to. Has been bed bound the past week.    #UTI  - on admission, leukocytosis, no fever, asymptomatic  urine c/s neg   off abx     #FTT :   - decrease oral intake / worsening dementia, currently AOx1 (bed-bound, not following commands, non-redirectable), possibly  2/2 UTI  - supportive care   - s/p IVF bolus      #COVID 19  - asymptomatic, without hypoxia   - will hold off on RDV/ dexa   - c/w lovenox 40 subq qd    #HTN  - SBP 100s today, need strict bp regimen  - c/w metoprolol tartrate 50mg BID in setting of low BP  - c/w amlodipine 5mg qd    # chronic afib   - not on any AC : likely 2/2 dementia and fall risk   - c/w amlodipine and metoprolol as above  - rate is controlled     #GERD  - c/w home meds    dispo: clinically ok , daughter don't want her to go to [facility] , but no one home to take care of her .    consult , as she seems clinically stable for d/c.  Son-in-law is setting up home private hire NA. Said it will be complete by 1/6.

## 2022-01-06 NOTE — DISCHARGE NOTE NURSING/CASE MANAGEMENT/SOCIAL WORK - PATIENT PORTAL LINK FT
You can access the FollowMyHealth Patient Portal offered by [facility] by registering at the following website: [email or web address]. By joining [facility]’s FollowMyHealth portal, you will also be able to view your health information using other applications (apps) compatible with our system.

## 2022-01-06 NOTE — DISCHARGE NOTE NURSING/CASE MANAGEMENT/SOCIAL WORK - NSDCPEFALRISK_GEN_ALL_CORE
For information on Fall & Injury Prevention, visit: [email or web address] OR  [email or web address] OR  https://www.cdc.gov/steadi/patient.html

## 2022-01-06 NOTE — PATIENT PROFILE ADULT - FALL HARM RISK - RISK INTERVENTIONS

## 2022-02-17 NOTE — ED ADULT NURSE NOTE - NS PRO PASSIVE SMOKE EXP
Health Maintenance Due   Topic Date Due   • COVID-19 Vaccine (1) Never done   • Influenza Vaccine (1) 09/01/2021   • Depression Screening  02/16/2022       Patient is due for topics as listed above but is not proceeding with Immunization(s) COVID-19 and Influenza at this time.    Unknown

## 2023-09-07 NOTE — H&P ADULT - NSICDXNOFAMILYHX_GEN_ALL_CORE
Rx sent to pharmacy, PDMP reviewed, fill date adjusted to 9/08/23    Ja Burgos DO   <-- Click to add NO pertinent Family History

## 2023-09-20 NOTE — ED ADULT NURSE NOTE - TEMPLATE LIST FOR HEAD TO TOE ASSESSMENT
What Is The Reason For Today's Visit?: Follow Up Basal Cell Carcinoma
How Many Bccs Have You Had?: more than one
When Was Your Last Cancer Diagnosed?: 2022
General

## 2024-04-16 NOTE — ED ADULT TRIAGE NOTE - MODE OF ARRIVAL
Pt c/o right lower back pain radiating down right leg x1 week. Pt able to ambulate without difficulty.    EMS

## 2025-01-10 NOTE — ED PROVIDER NOTE - OBJECTIVE STATEMENT
Your current Orthopaedic problem we are working together to treat is:  IT band tendonitis, status post left total hip replacement; Status post right total hip replacement.    Rest, ice, and Tylenol as needed.    PHYSICAL THERAPY/OCCUPATIONAL THERAPY  Physical Therapy  will help your recovery. Please call to schedule your therapy appointments at [facility], [phone]. It would be advisable to follow up with me upon completion of your therapy.     It is recommended you schedule a follow-up appointment with Gomez Ramirez MD  as needed.      Office hours are 8:00 am to 5:00 pm Monday through Friday. If it is urgent that you speak with someone outside of these hours, our [facility] Call Center will be able to assist you. You can reach the office by calling the:    [address] [place] [address]  [phone]    We do highly recommend [facility] with [facility], if you do not already have this. You can request access via the internet or by simply talking with a  at any of the clinics.   [email or web address]    Thank you for choosing [facility] as your Orthopedic provider!     89Fw/ HTN, afib (on asprin), pacemaker, p/w right hand pain. Pain since last night with swelling. Pt was unable to sleep 2/2 to pain. Pt did not take anything for the pain. No hx of recent trauma however pt fell on that hand one month ago. No fevers, chills, chest pain, abdominal pain, urinary symptoms. 89Fw/ HTN, afib (on asprin), pacemaker, p/w right hand pain. Pain since last night with swelling. Pt was unable to sleep 2/2 to pain. Pt did not take anything for the pain. No hx of recent trauma however pt fell on that hand one month ago. No fevers, chills, chest pain, abdominal pain, urinary symptoms. Pt endorses vomiting and diarrhea yesterday, tolerating PO today. No dark or bloody stools.